# Patient Record
Sex: MALE | Race: BLACK OR AFRICAN AMERICAN | NOT HISPANIC OR LATINO | Employment: FULL TIME | URBAN - METROPOLITAN AREA
[De-identification: names, ages, dates, MRNs, and addresses within clinical notes are randomized per-mention and may not be internally consistent; named-entity substitution may affect disease eponyms.]

---

## 2017-03-29 ENCOUNTER — TELEPHONE (OUTPATIENT)
Dept: HEMATOLOGY/ONCOLOGY | Facility: CLINIC | Age: 52
End: 2017-03-29

## 2017-03-29 NOTE — TELEPHONE ENCOUNTER
----- Message from Dheeraj Schaeffer MD sent at 3/29/2017 10:18 AM CDT -----  Contact: patient  Stop xarelto 2 days before colonoscopy and start back on it after colonoscopy unless otherwise advised by his GI doctor. Check with GI doctor before restart. If any questions let me know. Thank you.  ----- Message -----     From: Shae Xie MA     Sent: 3/29/2017  10:06 AM       To: Dheeraj Schaeffer MD        ----- Message -----     From: Tammy Ortega     Sent: 3/29/2017   9:54 AM       To: Maksim Esqueda Staff    Patient called to speak with the nurse; he is scheduled for a colonoscopy. Is he is supposed to stop taking the Xarelto? Please call him at [phone].    Thanks,  Tammy

## 2017-05-11 ENCOUNTER — DOCUMENTATION ONLY (OUTPATIENT)
Dept: HEMATOLOGY/ONCOLOGY | Facility: CLINIC | Age: 52
End: 2017-05-11

## 2017-05-11 NOTE — PROGRESS NOTES
1410  5-11-17  The following pt no showed his appt and has been rescheduled for 5-11-17. Notified pt. jeannine

## 2017-06-05 ENCOUNTER — OFFICE VISIT (OUTPATIENT)
Dept: HEMATOLOGY/ONCOLOGY | Facility: CLINIC | Age: 52
End: 2017-06-05
Payer: COMMERCIAL

## 2017-06-05 VITALS
DIASTOLIC BLOOD PRESSURE: 70 MMHG | WEIGHT: 198.63 LBS | HEART RATE: 67 BPM | RESPIRATION RATE: 18 BRPM | OXYGEN SATURATION: 98 % | HEIGHT: 74 IN | TEMPERATURE: 98 F | BODY MASS INDEX: 25.49 KG/M2 | SYSTOLIC BLOOD PRESSURE: 120 MMHG

## 2017-06-05 DIAGNOSIS — Z86.711 HISTORY OF PULMONARY EMBOLISM: Primary | ICD-10-CM

## 2017-06-05 DIAGNOSIS — Z86.718 HISTORY OF DVT (DEEP VEIN THROMBOSIS): ICD-10-CM

## 2017-06-05 PROCEDURE — 99214 OFFICE O/P EST MOD 30 MIN: CPT | Mod: S$GLB,,, | Performed by: INTERNAL MEDICINE

## 2017-06-05 PROCEDURE — 99999 PR PBB SHADOW E&M-EST. PATIENT-LVL III: CPT | Mod: PBBFAC,,, | Performed by: INTERNAL MEDICINE

## 2017-06-05 NOTE — PROGRESS NOTES
Reason for visit: Followup for recurrent left lower extremity DVT/History of PE  Date of diagnosis: 03/21/2013    HPI:   The patient is a 52-year-old  male who presents to the hematology oncology clinic today for for follow up. He has a history of recurrent left lower extremity DVT and history of pulmonary embolism. Today he feels well and has no specific complaints.  He denies any chest pain or shortness of breath/dyspnea with exertion.  He reports that he has been taking his xarelto as prescribed and has not missed any doses. He is tolerating this medication well. He denies any fever, chills or night sweats.  He denies any melena, hematochezia, hematemesis, hemoptysis or hematuria.  He denies any bowel or urinary complaints.  I have reviewed all of the patient's interval medical history available in EPIC and have utilized this in my evaluation and management recommendations today.  He has been exercising regularly. He is uptodate with colon cancer and prostate cancer screening.    PAST MEDICAL HISTORY:   1. Left lower extremity DVT (Sept 5, 2011)  2. Bilateral PE (Sept 5, 2011)  3. Paroxysmal SVT    SURGICAL HISTORY:   1. Appendectomy    FAMILY HISTORY: He reports that one of his sisters was diagnosed with a DVT at the age of 28. She has since been diagnosed with protein C deficiency and is currently on therapeutic anticoagulation. He reports that a maternal aunt was also diagnosed with protein C deficiency. His mother has leukemia and was diagnosed at the age of 61. He denies any other immediate family members with cancer.    SOCIAL HISTORY: She does not smoke cigarettes. He drinks alcohol socially. He does not use any recreational drugs. He works as a  with [employer]. He is  and has 3 biological children and 2 stepchildren. He lives with his wife in [place].    ALLERGIES: [NKDA]    MEDICATIONS: [Medcard has been reviewed and/or reconciled.]    REVIEW  OF SYSTEMS:   GENERAL: [No fevers, chills or sweats. No weight loss or loss of appetite.] He denies fatigue.  HEENT: [No blurred vision, tinnitus, nasal discharge, sorethroat or dysphagia.]  HEART: He denies chest pain, palpitations and shortness of breath.  LUNGS: [No hemoptysis.] He denies cough. He denies chest pain, palpations and shortness of breath.  ABDOMEN: [No abdominal pain, nausea, vomiting, diarrhea, constipation or melena.]  GENITOURINARY: [No dysuria, bleeding or malodorous discharge.]  NEURO: [No headache, dizziness or vertigo.]  HEMATOLOGY: [No easy bruising, spontaneous bleeding in the past].  MUSCULOSKELETAL: [No myalgias, arthralgias or bone pains.]   SKIN: [No rashes or skin lesions.]  PSYCHIATRY: [No depression or anxiety.]    PHYSICAL EXAMINATION:   VS: Reviewed on nurse's notes.  APPEARANCE: The patient is a well-developed, well-nourished and well-groomed  male who appears in no acute distress.  HEENT: No scleral icterus. Both external auditory canals clear. No oral ulcers, lesions. Throat clear  HEAD: No sinus tenderness.  NECK: Supple. No palpable lymphadenopathy. Thyroid non-tender, no palpable masses  CHEST: Breath sounds clear bilaterally. No rales. No rhonchi. Unlabored respirations.  CARDIOVASCULAR: Normal S1, S2. Normal rate. Regular rhythm.  ABDOMEN: Bowel sounds normal. No tenderness. No abdominal distention. No hepatomegaly. No splenomegaly.  LYMPHATIC: No palpable supraclavicular, axillary nodes  EXTREMITIES: No erythema. No clubbing, cyanosis. Mild edema noted in left lower extremity.   SKIN: No lesions. No petechiae. No induration or nodules  NEUROLOGIC: No focal findings. Alert & Oriented x 3. Mood appropriate to affect    LABS:   Reviewed    IMAGING:  Reviewed    IMPRESSION:  1. History of bilateral saddle pulmonary emboli  2. History of extensive left lower extremity DVT  3. Post phlebitic syndrome  4. 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 I had a detailed discussion today about his current clinical situation.  At this time the patient will continue on therapeutic anticoagulation with xarelto.   2. The patient understands that he will have to be on therapeutic anticoagulation for the rest of his life.    Return to clinic in 1 year for followup.  He knows to call sooner for any new problems or questions.    Dheeraj Schaeffer MD

## 2017-11-01 DIAGNOSIS — I82.4Y2 ACUTE DEEP VEIN THROMBOSIS (DVT) OF PROXIMAL VEIN OF LEFT LOWER EXTREMITY: ICD-10-CM

## 2017-11-01 RX ORDER — RIVAROXABAN 20 MG/1
TABLET, FILM COATED ORAL
Qty: 30 TABLET | Refills: 5 | Status: SHIPPED | OUTPATIENT
Start: 2017-11-01 | End: 2018-04-07 | Stop reason: SDUPTHER

## 2017-11-28 ENCOUNTER — TELEPHONE (OUTPATIENT)
Dept: HEMATOLOGY/ONCOLOGY | Facility: CLINIC | Age: 52
End: 2017-11-28

## 2017-11-28 NOTE — TELEPHONE ENCOUNTER
----- Message from Dheeraj Schaeffer MD sent at 11/28/2017  1:58 PM CST -----  Yes he can take antiinflammatory medicine as needed. thanks  ----- Message -----  From: Shae Xie MA  Sent: 11/28/2017   1:42 PM  To: Dheeraj Schaeffer MD     Is this ok to take?  ----- Message -----  From: Betty Kraus  Sent: 11/28/2017   1:38 PM  To: Maksim Esqueda Staff    Please call pt back about shoulder pain. Pt is not sure if he can take an antiflamatorry because he is on zerelto?????? Please call pt at [phone].

## 2017-12-13 ENCOUNTER — OFFICE VISIT (OUTPATIENT)
Dept: HEMATOLOGY/ONCOLOGY | Facility: CLINIC | Age: 52
End: 2017-12-13
Payer: COMMERCIAL

## 2017-12-13 ENCOUNTER — HOSPITAL ENCOUNTER (OUTPATIENT)
Dept: RADIOLOGY | Facility: HOSPITAL | Age: 52
Discharge: HOME | End: 2017-12-13
Attending: INTERNAL MEDICINE
Payer: COMMERCIAL

## 2017-12-13 VITALS
HEART RATE: 87 BPM | HEIGHT: 74 IN | BODY MASS INDEX: 26.43 KG/M2 | OXYGEN SATURATION: 100 % | SYSTOLIC BLOOD PRESSURE: 133 MMHG | RESPIRATION RATE: 18 BRPM | TEMPERATURE: 98 F | WEIGHT: 205.94 LBS | DIASTOLIC BLOOD PRESSURE: 88 MMHG

## 2017-12-13 DIAGNOSIS — D68.59 PRIMARY HYPERCOAGULABLE STATE: ICD-10-CM

## 2017-12-13 DIAGNOSIS — M79.89 LEG SWELLING: ICD-10-CM

## 2017-12-13 DIAGNOSIS — Z86.718 HISTORY OF DVT (DEEP VEIN THROMBOSIS): Primary | ICD-10-CM

## 2017-12-13 DIAGNOSIS — Z86.711 HISTORY OF PULMONARY EMBOLISM: ICD-10-CM

## 2017-12-13 DIAGNOSIS — Z86.718 HISTORY OF DVT (DEEP VEIN THROMBOSIS): ICD-10-CM

## 2017-12-13 DIAGNOSIS — I87.009 POST-PHLEBITIC SYNDROME: ICD-10-CM

## 2017-12-13 PROCEDURE — 93971 EXTREMITY STUDY: CPT | Mod: 26,,, | Performed by: RADIOLOGY

## 2017-12-13 PROCEDURE — 99999 PR PBB SHADOW E&M-EST. PATIENT-LVL III: CPT | Mod: PBBFAC,,, | Performed by: INTERNAL MEDICINE

## 2017-12-13 PROCEDURE — 93971 EXTREMITY STUDY: CPT | Mod: TC,PO

## 2017-12-13 PROCEDURE — 99214 OFFICE O/P EST MOD 30 MIN: CPT | Mod: S$GLB,,, | Performed by: INTERNAL MEDICINE

## 2017-12-13 NOTE — PROGRESS NOTES
Reason for visit: Followup for recurrent left lower extremity DVT/History of PE/urgent care visit     HPI:   The patient is a 52-year-old  male who presents to the hematology oncology clinic today for an urgent care visit. He has a history of recurrent left lower extremity DVT and history of pulmonary embolism. Today he reports that since yesterday after known he has been having increased pain and swelling in his left thigh.  He has been off his Xarelto for the past 2 days for a scheduled left shoulder manipulation procedure tomorrow with orthopedics.   Otherwise feels well and has no specific complaints.  He denies any chest pain or shortness of breath/dyspnea with exertion.  He denies any fever, chills or night sweats.  He denies any melena, hematochezia, hematemesis, hemoptysis or hematuria.  He denies any bowel or urinary complaints.  I have reviewed all of the patient's interval medical history available in EPIC and have utilized this in my evaluation and management recommendations today.  He has been exercising regularly. He is uptodate with colon cancer and prostate cancer screening.    PAST MEDICAL HISTORY:   1. Left lower extremity DVT (Sept 5, 2011)  2. Bilateral PE (Sept 5, 2011)  3. Paroxysmal SVT    SURGICAL HISTORY:   1. Appendectomy    FAMILY HISTORY: He reports that one of his sisters was diagnosed with a DVT at the age of 28. She has since been diagnosed with protein C deficiency and is currently on therapeutic anticoagulation. He reports that a maternal aunt was also diagnosed with protein C deficiency. His mother has leukemia and was diagnosed at the age of 61. He denies any other immediate family members with cancer.    SOCIAL HISTORY: She does not smoke cigarettes. He drinks alcohol socially. He does not use any recreational drugs. He works as a  with [employer]. He is  and has 3 biological children and 2 stepchildren. He lives with his wife  in [place].    ALLERGIES: [NKDA]    MEDICATIONS: [Medcard has been reviewed and/or reconciled.]    REVIEW OF SYSTEMS:   GENERAL: [No fevers, chills or sweats. No weight loss or loss of appetite.] He denies fatigue.  HEENT: [No blurred vision, tinnitus, nasal discharge, sorethroat or dysphagia.]  HEART: He denies chest pain, palpitations and shortness of breath.  LUNGS: [No hemoptysis.] He denies cough. He denies chest pain, palpations and shortness of breath.  ABDOMEN: [No abdominal pain, nausea, vomiting, diarrhea, constipation or melena.]  GENITOURINARY: [No dysuria, bleeding or malodorous discharge.]  NEURO: [No headache, dizziness or vertigo.]  HEMATOLOGY: [No easy bruising, spontaneous bleeding in the past].  MUSCULOSKELETAL: [No myalgias, arthralgias or bone pains.]   SKIN: [No rashes or skin lesions.]  PSYCHIATRY: [No depression or anxiety.]    PHYSICAL EXAMINATION:   VS: Reviewed on nurse's notes.  APPEARANCE: The patient is a well-developed, well-nourished and well-groomed  male who appears in no acute distress.  HEENT: No scleral icterus. Both external auditory canals clear. No oral ulcers, lesions. Throat clear  HEAD: No sinus tenderness.  NECK: Supple. No palpable lymphadenopathy. Thyroid non-tender, no palpable masses  CHEST: Breath sounds clear bilaterally. No rales. No rhonchi. Unlabored respirations.  CARDIOVASCULAR: Normal S1, S2. Normal rate. Regular rhythm.  ABDOMEN: Bowel sounds normal. No tenderness. No abdominal distention. No hepatomegaly. No splenomegaly.  LYMPHATIC: No palpable supraclavicular, axillary nodes  EXTREMITIES: No erythema. No clubbing, cyanosis. Mild edema noted in left lower extremity in the area of the thigh.   SKIN: No lesions. No petechiae. No induration or nodules  NEUROLOGIC: No focal findings. Alert & Oriented x 3. Mood appropriate to affect    LABS:   Reviewed    IMAGING:  Reviewed    IMPRESSION:  1. History of bilateral saddle pulmonary emboli  2.  History of extensive left lower extremity DVT  3. Post phlebitic syndrome  4. Heterozygosity for prothrombin [id number] mutation  5.  Left lower extremity edema    PLAN:  1. I had a detailed discussion today about his current clinical situation.  I did obtain an ultrasound of the left lower extremity today.  This shows evidence of his known chronic clot but there appears to be no evidence of new acute DVT in the left lower extremity.  The findings were discussed in detail with the patient and his wife today.  He was advised to take Tylenol PRN pain and to keep the limb elevated.  2.  Considering the above he will proceed with his orthopedic procedure tomorrow as scheduled.  He has been advised to resume his therapeutic anticoagulation with Xarelto immediately after completion of his procedure.  3. The patient understands that he will have to be on therapeutic anticoagulation for the rest of his life.  4.  He knows to seek immediate attention/proceed to the emergency room/contact my office if nonresolution and/or worsening of his symptoms or if any new symptoms such as chest pain or shortness of breath.    Return to clinic in 6 months for followup.  He knows to call sooner for any new problems or questions.    Dheeraj Schaeffer MD

## 2017-12-14 ENCOUNTER — TELEPHONE (OUTPATIENT)
Dept: HEMATOLOGY/ONCOLOGY | Facility: CLINIC | Age: 52
End: 2017-12-14

## 2017-12-14 NOTE — TELEPHONE ENCOUNTER
----- Message from Dheeraj Schaeffer MD sent at 12/13/2017  5:32 PM CST -----  Please schedule follow-up for the patient to come and see me back in 6 months in the clinic at [facility].  Thank you.

## 2017-12-15 ENCOUNTER — TELEPHONE (OUTPATIENT)
Dept: HEMATOLOGY/ONCOLOGY | Facility: CLINIC | Age: 52
End: 2017-12-15

## 2017-12-15 NOTE — TELEPHONE ENCOUNTER
----- Message from Alhtea Young sent at 12/15/2017  3:28 PM CST -----  Contact: [phone]  Pt would like to consult with the nurse about [place].  Please call back at [phone]. Thx-AH

## 2018-04-07 DIAGNOSIS — I82.4Y2 ACUTE DEEP VEIN THROMBOSIS (DVT) OF PROXIMAL VEIN OF LEFT LOWER EXTREMITY: ICD-10-CM

## 2018-04-09 RX ORDER — RIVAROXABAN 20 MG/1
TABLET, FILM COATED ORAL
Qty: 90 TABLET | Refills: 1 | Status: SHIPPED | OUTPATIENT
Start: 2018-04-09 | End: 2018-10-31 | Stop reason: SDUPTHER

## 2018-08-12 ENCOUNTER — HOSPITAL ENCOUNTER (EMERGENCY)
Facility: HOSPITAL | Age: 53
Discharge: HOME | End: 2018-08-12
Attending: EMERGENCY MEDICINE
Payer: COMMERCIAL

## 2018-08-12 VITALS
DIASTOLIC BLOOD PRESSURE: 72 MMHG | OXYGEN SATURATION: 96 % | BODY MASS INDEX: 26.82 KG/M2 | TEMPERATURE: 98 F | SYSTOLIC BLOOD PRESSURE: 148 MMHG | RESPIRATION RATE: 18 BRPM | HEART RATE: 77 BPM | WEIGHT: 209 LBS | HEIGHT: 74 IN
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
## 2018-08-12 DIAGNOSIS — S16.1XXA STRAIN OF NECK MUSCLE, INITIAL ENCOUNTER: ICD-10-CM

## 2018-08-12 DIAGNOSIS — S09.90XA INJURY OF HEAD, INITIAL ENCOUNTER: ICD-10-CM

## 2018-08-12 PROCEDURE — 99284 EMERGENCY DEPT VISIT MOD MDM: CPT | Mod: 25

## 2018-08-12 PROCEDURE — 25000003 PHARM REV CODE 250: Performed by: REGISTERED NURSE

## 2018-08-12 RX ORDER — CYCLOBENZAPRINE HCL 10 MG
10 TABLET ORAL
Status: COMPLETED | OUTPATIENT
Start: 2018-08-12 | End: 2018-08-12

## 2018-08-12 RX ORDER — HYDROCODONE BITARTRATE AND ACETAMINOPHEN 5; 325 MG/1; MG/1
1 TABLET ORAL EVERY 6 HOURS PRN
Qty: 12 TABLET | Refills: 0 | Status: SHIPPED | OUTPATIENT
Start: 2018-08-12 | End: 2019-05-06 | Stop reason: ALTCHOICE

## 2018-08-12 RX ORDER — HYDROCODONE BITARTRATE AND ACETAMINOPHEN 5; 325 MG/1; MG/1
1 TABLET ORAL
Status: COMPLETED | OUTPATIENT
Start: 2018-08-12 | End: 2018-08-12

## 2018-08-12 RX ORDER — CYCLOBENZAPRINE HCL 10 MG
10 TABLET ORAL 3 TIMES DAILY PRN
Qty: 15 TABLET | Refills: 0 | Status: SHIPPED | OUTPATIENT
Start: 2018-08-12 | End: 2018-08-17

## 2018-08-12 RX ADMIN — HYDROCODONE BITARTRATE AND ACETAMINOPHEN 1 TABLET: 5; 325 TABLET ORAL at 09:08

## 2018-08-12 RX ADMIN — CYCLOBENZAPRINE HYDROCHLORIDE 10 MG: 10 TABLET, FILM COATED ORAL at 09:08
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
## 2018-10-31 DIAGNOSIS — I82.4Y2 ACUTE DEEP VEIN THROMBOSIS (DVT) OF PROXIMAL VEIN OF LEFT LOWER EXTREMITY: ICD-10-CM

## 2018-10-31 RX ORDER — RIVAROXABAN 20 MG/1
TABLET, FILM COATED ORAL
Qty: 90 TABLET | Refills: 3 | Status: SHIPPED | OUTPATIENT
Start: 2018-10-31 | End: 2019-05-06 | Stop reason: SDUPTHER

## 2019-05-06 ENCOUNTER — HOSPITAL ENCOUNTER (EMERGENCY)
Facility: HOSPITAL | Age: 54
Discharge: HOME | End: 2019-05-06
Attending: EMERGENCY MEDICINE
Payer: COMMERCIAL

## 2019-05-06 VITALS
HEIGHT: 74 IN | WEIGHT: 205.5 LBS | BODY MASS INDEX: 26.37 KG/M2 | RESPIRATION RATE: 16 BRPM | SYSTOLIC BLOOD PRESSURE: 98 MMHG | HEART RATE: 66 BPM | DIASTOLIC BLOOD PRESSURE: 66 MMHG | TEMPERATURE: 98 F | OXYGEN SATURATION: 99 %

## 2019-05-06 DIAGNOSIS — R07.9 CHEST PAIN: ICD-10-CM

## 2019-05-06 DIAGNOSIS — R05.9 COUGH: Primary | ICD-10-CM

## 2019-05-06 DIAGNOSIS — R06.02 SHORTNESS OF BREATH: ICD-10-CM

## 2019-05-06 LAB
ALBUMIN SERPL BCP-MCNC: 4 G/DL (ref 3.5–5.2)
ALP SERPL-CCNC: 70 U/L (ref 55–135)
ALT SERPL W/O P-5'-P-CCNC: 19 U/L (ref 10–44)
ANION GAP SERPL CALC-SCNC: 9 MMOL/L (ref 8–16)
AST SERPL-CCNC: 17 U/L (ref 10–40)
BASOPHILS # BLD AUTO: 0.02 K/UL (ref 0–0.2)
BASOPHILS NFR BLD: 0.3 % (ref 0–1.9)
BILIRUB SERPL-MCNC: 0.5 MG/DL (ref 0.1–1)
BNP SERPL-MCNC: 18 PG/ML (ref 0–99)
BUN SERPL-MCNC: 14 MG/DL (ref 6–20)
CALCIUM SERPL-MCNC: 9.7 MG/DL (ref 8.7–10.5)
CHLORIDE SERPL-SCNC: 104 MMOL/L (ref 95–110)
CO2 SERPL-SCNC: 26 MMOL/L (ref 23–29)
CREAT SERPL-MCNC: 1.1 MG/DL (ref 0.5–1.4)
D DIMER PPP IA.FEU-MCNC: <0.19 MG/L FEU
DIFFERENTIAL METHOD: ABNORMAL
EOSINOPHIL # BLD AUTO: 0.4 K/UL (ref 0–0.5)
EOSINOPHIL NFR BLD: 6.2 % (ref 0–8)
ERYTHROCYTE [DISTWIDTH] IN BLOOD BY AUTOMATED COUNT: 14.7 % (ref 11.5–14.5)
EST. GFR  (AFRICAN AMERICAN): >60 ML/MIN/1.73 M^2
EST. GFR  (NON AFRICAN AMERICAN): >60 ML/MIN/1.73 M^2
GLUCOSE SERPL-MCNC: 86 MG/DL (ref 70–110)
HCT VFR BLD AUTO: 43.7 % (ref 40–54)
HGB BLD-MCNC: 14.5 G/DL (ref 14–18)
LYMPHOCYTES # BLD AUTO: 3.3 K/UL (ref 1–4.8)
LYMPHOCYTES NFR BLD: 52.1 % (ref 18–48)
MCH RBC QN AUTO: 27.5 PG (ref 27–31)
MCHC RBC AUTO-ENTMCNC: 33.2 G/DL (ref 32–36)
MCV RBC AUTO: 83 FL (ref 82–98)
MONOCYTES # BLD AUTO: 0.4 K/UL (ref 0.3–1)
MONOCYTES NFR BLD: 5.7 % (ref 4–15)
NEUTROPHILS # BLD AUTO: 2.2 K/UL (ref 1.8–7.7)
NEUTROPHILS NFR BLD: 35.7 % (ref 38–73)
PLATELET # BLD AUTO: 226 K/UL (ref 150–350)
PMV BLD AUTO: 10.6 FL (ref 9.2–12.9)
POTASSIUM SERPL-SCNC: 4 MMOL/L (ref 3.5–5.1)
PROT SERPL-MCNC: 7.5 G/DL (ref 6–8.4)
RBC # BLD AUTO: 5.27 M/UL (ref 4.6–6.2)
SODIUM SERPL-SCNC: 139 MMOL/L (ref 136–145)
TROPONIN I SERPL DL<=0.01 NG/ML-MCNC: <0.006 NG/ML (ref 0–0.03)
WBC # BLD AUTO: 6.28 K/UL (ref 3.9–12.7)

## 2019-05-06 PROCEDURE — 93010 EKG 12-LEAD: ICD-10-PCS | Mod: ,,, | Performed by: INTERNAL MEDICINE

## 2019-05-06 PROCEDURE — 36415 COLL VENOUS BLD VENIPUNCTURE: CPT

## 2019-05-06 PROCEDURE — 85379 FIBRIN DEGRADATION QUANT: CPT

## 2019-05-06 PROCEDURE — 93010 ELECTROCARDIOGRAM REPORT: CPT | Mod: ,,, | Performed by: INTERNAL MEDICINE

## 2019-05-06 PROCEDURE — 93005 ELECTROCARDIOGRAM TRACING: CPT

## 2019-05-06 PROCEDURE — 99285 EMERGENCY DEPT VISIT HI MDM: CPT | Mod: 25

## 2019-05-06 PROCEDURE — 83880 ASSAY OF NATRIURETIC PEPTIDE: CPT

## 2019-05-06 PROCEDURE — 80053 COMPREHEN METABOLIC PANEL: CPT

## 2019-05-06 PROCEDURE — 85025 COMPLETE CBC W/AUTO DIFF WBC: CPT

## 2019-05-06 PROCEDURE — 84484 ASSAY OF TROPONIN QUANT: CPT

## 2019-05-06 RX ORDER — AZITHROMYCIN 250 MG/1
250 TABLET, FILM COATED ORAL DAILY
Qty: 6 TABLET | Refills: 0 | Status: SHIPPED | OUTPATIENT
Start: 2019-05-06 | End: 2019-06-03

## 2019-05-06 RX ORDER — NAPROXEN 375 MG/1
375 TABLET ORAL 2 TIMES DAILY WITH MEALS
Qty: 60 TABLET | Refills: 0 | Status: SHIPPED | OUTPATIENT
Start: 2019-05-06 | End: 2019-06-03

## 2019-05-06 NOTE — ED PROVIDER NOTES
"SCRIBE #1 NOTE: I, Corinne Mack, am scribing for, and in the presence of, Ren Michel Do, MD. I have scribed the entire note.      History      Chief Complaint   Patient presents with    Shortness of Breath     Pt states, "I have had a cough for about a month and now every time I breathe I get a dull pain and I have a history of PE in both lungs."       Review of patient's allergies indicates:  No Known Allergies     HPI   HPI    5/6/2019, 5:25 PM   History obtained from the patient      History of Present Illness: Jean-Paul Reeves is a 53 y.o. male patient with PMHx of PE who presents to the Emergency Department for SOB which onset gradually a few days ago. Pt has had a cough for about a month and saw his PCP on 04/29/19. Pt was dx with bronchitis and started on steroids and abx. Pt's cough has persisted despite tx and now he is having L upper CP. Pt reports that he has also been on several flight and long road trips recently. Symptoms are constant and moderate in severity. No mitigating or exacerbating factors reported. Associated sxs include congestion. Patient denies any fever, chills, rhinorrhea, sore throat, N/V/D, abd pain, neck pain, back pain, HA, dizziness, and all other sxs at this time. No prior Tx reported. Pt is on xarelto. No further complaints or concerns at this time.         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   HENT: Positive for congestion. Negative for rhinorrhea and sore throat.    Respiratory: Positive for cough and shortness of breath.    Cardiovascular: Positive for chest pain (L upper). Negative for leg swelling.   Gastrointestinal: Negative for abdominal pain, diarrhea, nausea and vomiting.   Musculoskeletal: Negative for back pain, neck pain and neck stiffness.   Skin: Negative for rash and wound.   Neurological: Negative for dizziness, light-headedness, numbness and headaches.   All other systems reviewed and are negative.    Physical Exam      Initial Vitals [05/06/19 1702]   BP Pulse Resp Temp SpO2   (!) 182/86 70 20 98.2 °F (36.8 °C) 98 %      MAP       --          Physical Exam  Nursing Notes and Vital Signs Reviewed.  Constitutional: Patient is in no acute distress. Well-developed and well-nourished.  Head: Atraumatic. Normocephalic.  Eyes: PERRL. EOM intact. Conjunctivae are not pale. No scleral icterus.  ENT: Mucous membranes are moist. Oropharynx is clear and symmetric.    Neck: Supple. Full ROM. No lymphadenopathy.  Cardiovascular: Regular rate. Regular rhythm. No murmurs, rubs, or gallops. Distal pulses are 2+ and symmetric.  Pulmonary/Chest: No respiratory distress. Clear to auscultation bilaterally. No wheezing or rales.  Abdominal: Soft and non-distended.  There is no tenderness.  No rebound, guarding, or rigidity.   Musculoskeletal: Moves all extremities. No obvious deformities. No edema.   Skin: Warm and dry.  Neurological:  Alert, awake, and appropriate.  Normal speech.  No acute focal neurological deficits are appreciated.  Psychiatric: Normal affect. Good eye contact. Appropriate in content.    ED Course    Procedures  ED Vital Signs:  Vitals:    05/06/19 1702 05/06/19 1723 05/06/19 1808   BP: (!) 182/86 126/79 129/71   Pulse: 70 69 66   Resp: 20 16 18   Temp: 98.2  "°F (36.8 °C)     TempSrc: Oral     SpO2: 98% 99% 97%   Weight: 93.2 kg (205 lb 7.5 oz)     Height: 6' 2" (1.88 m)         Abnormal Lab Results:  Labs Reviewed   CBC W/ AUTO DIFFERENTIAL - Abnormal; Notable for the following components:       Result Value    RDW 14.7 (*)     Gran% 35.7 (*)     Lymph% 52.1 (*)     All other components within normal limits   COMPREHENSIVE METABOLIC PANEL   TROPONIN I   B-TYPE NATRIURETIC PEPTIDE   D DIMER, QUANTITATIVE        All Lab Results:  Results for orders placed or performed during the hospital encounter of 05/06/19   CBC auto differential   Result Value Ref Range    WBC 6.28 3.90 - 12.70 K/uL    RBC 5.27 4.60 - 6.20 M/uL    Hemoglobin 14.5 14.0 - 18.0 g/dL    Hematocrit 43.7 40.0 - 54.0 %    Mean Corpuscular Volume 83 82 - 98 fL    Mean Corpuscular Hemoglobin 27.5 27.0 - 31.0 pg    Mean Corpuscular Hemoglobin Conc 33.2 32.0 - 36.0 g/dL    RDW 14.7 (H) 11.5 - 14.5 %    Platelets 226 150 - 350 K/uL    MPV 10.6 9.2 - 12.9 fL    Gran # (ANC) 2.2 1.8 - 7.7 K/uL    Lymph # 3.3 1.0 - 4.8 K/uL    Mono # 0.4 0.3 - 1.0 K/uL    Eos # 0.4 0.0 - 0.5 K/uL    Baso # 0.02 0.00 - 0.20 K/uL    Gran% 35.7 (L) 38.0 - 73.0 %    Lymph% 52.1 (H) 18.0 - 48.0 %    Mono% 5.7 4.0 - 15.0 %    Eosinophil% 6.2 0.0 - 8.0 %    Basophil% 0.3 0.0 - 1.9 %    Differential Method Automated    Comprehensive metabolic panel   Result Value Ref Range    Sodium 139 136 - 145 mmol/L    Potassium 4.0 3.5 - 5.1 mmol/L    Chloride 104 95 - 110 mmol/L    CO2 26 23 - 29 mmol/L    Glucose 86 70 - 110 mg/dL    BUN, Bld 14 6 - 20 mg/dL    Creatinine 1.1 0.5 - 1.4 mg/dL    Calcium 9.7 8.7 - 10.5 mg/dL    Total Protein 7.5 6.0 - 8.4 g/dL    Albumin 4.0 3.5 - 5.2 g/dL    Total Bilirubin 0.5 0.1 - 1.0 mg/dL    Alkaline Phosphatase 70 55 - 135 U/L    AST 17 10 - 40 U/L    ALT 19 10 - 44 U/L    Anion Gap 9 8 - 16 mmol/L    eGFR if African American >60 >60 mL/min/1.73 m^2    eGFR if non African American >60 >60 mL/min/1.73 m^2 "   Troponin I #1   Result Value Ref Range    Troponin I <0.006 0.000 - 0.026 ng/mL   B-Type natriuretic peptide (BNP)   Result Value Ref Range    BNP 18 0 - 99 pg/mL   D dimer, quantitative   Result Value Ref Range    D-Dimer <0.19 <0.50 mg/L FEU       Imaging Results:  Imaging Results          X-Ray Chest PA And Lateral (Final result)  Result time 05/06/19 17:43:36    Final result by Tremaine Stone MD (05/06/19 17:43:36)                 Impression:      No acute findings.      Electronically signed by: Tremaine Stone MD  Date:    05/06/2019  Time:    17:43             Narrative:    EXAMINATION:  XR CHEST PA AND LATERAL    CLINICAL HISTORY:  Chest Pain;    TECHNIQUE:  PA and lateral views of the chest were performed.    COMPARISON:  11/16/2014    FINDINGS:  The cardiac and mediastinal silhouettes appear within normal limits.   The lungs are clear bilaterally.  No acute osseous findings demonstrated.                                 The EKG was ordered, reviewed, and independently interpreted by the ED provider.  Interpretation time: 1705  Rate: 63 BPM  Rhythm: normal sinus rhythm  Interpretation: Possible L atrial enlargement. Incomplete RBBB. Septal infarct. No STEMI.           The Emergency Provider reviewed the vital signs and test results, which are outlined above.    ED Discussion     6:31 PM: Reassessed pt at this time. Pt is awake, alert, and in no distress. Discussed with pt all pertinent ED information and results. Discussed pt dx and plan of tx. Pt will be started on a Z-darlene since his cough has lasted over a month. Gave pt all f/u and return to the ED instructions. All questions and concerns were addressed at this time. Pt expresses understanding of information and instructions, and is comfortable with plan to discharge. Pt is stable for discharge.    I discussed with patient and/or family/caretaker that evaluation in the ED does not suggest any emergent or life threatening medical conditions requiring immediate  intervention beyond what was provided in the ED, and I believe patient is safe for discharge.  Regardless, an unremarkable evaluation in the ED does not preclude the development or presence of a serious of life threatening condition. As such, patient was instructed to return immediately for any worsening or change in current symptoms.    ED Medication(s):  Medications - No data to display       Medication List      START taking these medications    azithromycin 250 MG tablet  Commonly known as:  Z-CHIDI  Take 1 tablet (250 mg total) by mouth once daily. Take first 2 tablets together, then 1 every day until finished.     naproxen 375 MG tablet  Commonly known as:  NAPROSYN  Take 1 tablet (375 mg total) by mouth 2 (two) times daily with meals.        ASK your doctor about these medications    AMBIEN 10 mg Tab  Generic drug:  zolpidem     XARELTO 20 mg Tab  Generic drug:  rivaroxaban  TAKE 1 TABLET BY MOUTH EVERY DAY           Where to Get Your Medications      These medications were sent to [facility] [address] [place] [id number]    Phone:  [phone]   · azithromycin 250 MG tablet  · naproxen 375 MG tablet         Follow-up Information     Yosvany Winn MD In 2 days.    Specialty:  Family Medicine  Contact information:  [address] [facility] [address] [phone]                     Medical Decision Making    Medical Decision Making:   Clinical Tests:   Lab Tests: Ordered and Reviewed  Radiological Study: Ordered and Reviewed  Medical Tests: Ordered and Reviewed           Scribe Attestation:   Scribe #1: I performed the above scribed service and the documentation accurately describes the services I performed. I attest to the accuracy of the note 05/06/2019.    Attending:   Physician Attestation Statement for Scribe #1: I, Ren Michel Do, MD, personally performed the services described in this documentation, as  scribed by Corinne Mack, in my presence, and it is both accurate and complete.          Clinical Impression       ICD-10-CM ICD-9-CM   1. Cough R05 786.2   2. Shortness of breath R06.02 786.05   3. Chest pain R07.9 786.50       Disposition:   Disposition: Discharged  Condition: Stable           Ren Michel Do, MD  05/06/19 [id number]

## 2019-05-28 DIAGNOSIS — R05.9 COUGH: Primary | ICD-10-CM

## 2019-06-03 ENCOUNTER — OFFICE VISIT (OUTPATIENT)
Dept: PULMONOLOGY | Facility: CLINIC | Age: 54
End: 2019-06-03
Payer: COMMERCIAL

## 2019-06-03 ENCOUNTER — HOSPITAL ENCOUNTER (OUTPATIENT)
Dept: RADIOLOGY | Facility: HOSPITAL | Age: 54
Discharge: HOME | End: 2019-06-03
Attending: NURSE PRACTITIONER
Payer: COMMERCIAL

## 2019-06-03 VITALS
HEIGHT: 74 IN | OXYGEN SATURATION: 99 % | BODY MASS INDEX: 26.28 KG/M2 | HEART RATE: 68 BPM | SYSTOLIC BLOOD PRESSURE: 121 MMHG | DIASTOLIC BLOOD PRESSURE: 70 MMHG | RESPIRATION RATE: 18 BRPM | WEIGHT: 204.81 LBS

## 2019-06-03 DIAGNOSIS — R05.9 COUGH: ICD-10-CM

## 2019-06-03 DIAGNOSIS — J45.30 MILD PERSISTENT ASTHMA WITHOUT COMPLICATION: Primary | ICD-10-CM

## 2019-06-03 DIAGNOSIS — J30.89 NON-SEASONAL ALLERGIC RHINITIS DUE TO OTHER ALLERGIC TRIGGER: ICD-10-CM

## 2019-06-03 DIAGNOSIS — J45.991 COUGH VARIANT ASTHMA: ICD-10-CM

## 2019-06-03 DIAGNOSIS — R09.82 POST-NASAL DRIP: ICD-10-CM

## 2019-06-03 PROCEDURE — 71046 XR CHEST PA AND LATERAL: ICD-10-PCS | Mod: 26,,, | Performed by: RADIOLOGY

## 2019-06-03 PROCEDURE — 99999 PR PBB SHADOW E&M-EST. PATIENT-LVL III: ICD-10-PCS | Mod: PBBFAC,,, | Performed by: NURSE PRACTITIONER

## 2019-06-03 PROCEDURE — 71046 X-RAY EXAM CHEST 2 VIEWS: CPT | Mod: 26,,, | Performed by: RADIOLOGY

## 2019-06-03 PROCEDURE — 3008F PR BODY MASS INDEX (BMI) DOCUMENTED: ICD-10-PCS | Mod: CPTII,S$GLB,, | Performed by: NURSE PRACTITIONER

## 2019-06-03 PROCEDURE — 99204 OFFICE O/P NEW MOD 45 MIN: CPT | Mod: S$GLB,,, | Performed by: NURSE PRACTITIONER

## 2019-06-03 PROCEDURE — 99999 PR PBB SHADOW E&M-EST. PATIENT-LVL III: CPT | Mod: PBBFAC,,, | Performed by: NURSE PRACTITIONER

## 2019-06-03 PROCEDURE — 3008F BODY MASS INDEX DOCD: CPT | Mod: CPTII,S$GLB,, | Performed by: NURSE PRACTITIONER

## 2019-06-03 PROCEDURE — 71046 X-RAY EXAM CHEST 2 VIEWS: CPT | Mod: TC

## 2019-06-03 PROCEDURE — 99204 PR OFFICE/OUTPT VISIT, NEW, LEVL IV, 45-59 MIN: ICD-10-PCS | Mod: S$GLB,,, | Performed by: NURSE PRACTITIONER

## 2019-06-03 RX ORDER — FLUTICASONE PROPIONATE 50 MCG
2 SPRAY, SUSPENSION (ML) NASAL DAILY
Qty: 16 G | Refills: 11 | Status: SHIPPED | OUTPATIENT
Start: 2019-06-03 | End: 2020-06-02

## 2019-06-03 RX ORDER — MONTELUKAST SODIUM 10 MG/1
10 TABLET ORAL NIGHTLY
Qty: 90 TABLET | Refills: 3 | Status: SHIPPED | OUTPATIENT
Start: 2019-06-03 | End: 2020-05-29

## 2019-06-03 NOTE — PROGRESS NOTES
Subjective:      Patient ID: Jean-Paul Reeves is a 54 y.o. male.    Patient Active Problem List   Diagnosis    History of pulmonary embolism    Primary hypercoagulable state    Internal derangement of knee joint    Chondromalacia of left knee    PSVT (paroxysmal supraventricular tachycardia)    History of DVT (deep vein thrombosis)    Post-phlebitic syndrome    Mild persistent asthma without complication     he has been referred by [facility] for evaluation and management for   Chief Complaint   Patient presents with    Cough    Shortness of Breath     Chief Complaint: Cough and Shortness of Breath    HPI:  Jean-Paul Reeves is a 54 y.o. male presents to pulmonary clinic initial evaluation related to chronic cough since before 2011.   History of Asthma diagnosed as a child. Never treatment for asthma as a teen or adult.  He had recent ER visit 5/6/2019 diagnosis with Bronchitis, provided z pack there has been some gradual improvement in cough since recent flare back to baseline of intermittent cough.    He had seen his primary care provider, Dr. Winn in early April 2019 with steroid injection and antibiotic therapy, no improvement in cough. Patient presented to ER with history of dvt and bilateral PE in 2011 for evaluation of cough and chest pain with real deep breath.    On Xarelto therapy since about 2015.   Not inhaler therapy. No wheezing reported.   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 Occupational exposure to no exposure to fumes or dust or chemical . Has full face respirator to use if handling powders, about once a month use of respirator.     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       Pet Exposures:  Dogs, miniature poddle. Denies allergy to Dog and  cat dander.    Previous Report Reviewed: lab  reports, office notes and radiology reports.      Past Medical History: The following portions of the patient's history were reviewed and updated as appropriate:   He  has a past surgical history that includes Appendectomy.  His family history includes Cancer in his mother.  He  reports that he has never smoked. He has never used smokeless tobacco. He reports that he drinks alcohol. He reports that he does not use drugs.  He has a current medication list which includes the following prescription(s): xarelto, zolpidem, albuterol sulfate, fluticasone propionate, and montelukast.  He has No Known Allergies.    Review of Systems   Constitutional: Negative for fever, chills, weight loss, weight gain, activity change, appetite change, fatigue and night sweats.   HENT: Positive for postnasal drip. Negative for nosebleeds, rhinorrhea, sinus pressure, sore throat, trouble swallowing, voice change, congestion and ear pain.    Eyes: Negative for redness and itching.   Respiratory: Positive for cough (chronic) and chest tightness (in past with Bronchitis flare early April 2019). Negative for apnea, snoring, sputum production, choking, shortness of breath, wheezing, orthopnea, asthma nighttime symptoms, dyspnea on extertion, use of rescue inhaler and somnolence.    Cardiovascular: Negative.  Negative for chest pain, palpitations and leg swelling.   Genitourinary: Negative for difficulty urinating and hematuria.   Endocrine: Negative for cold intolerance and heat intolerance.    Musculoskeletal: Negative for arthralgias, gait problem, joint swelling and myalgias.   Skin: Negative.    Gastrointestinal: Negative for nausea, vomiting, abdominal pain and acid reflux.   Neurological: Negative for dizziness, weakness, light-headedness and headaches.   Hematological: Negative for adenopathy. No excessive bruising.   Psychiatric/Behavioral: Negative.  Negative for sleep disturbance. The patient is not nervous/anxious.    All other  "systems reviewed and are negative.       Objective:   /70   Pulse 68   Resp 18   Ht 6' 2" (1.88 m)   Wt 92.9 kg (204 lb 12.9 oz)   SpO2 99%   BMI 26.30 kg/m²   Physical Exam   Constitutional: He is oriented to person, place, and time. Vital signs are normal. He appears well-developed and well-nourished. He is active and cooperative.  Non-toxic appearance. He does not appear ill. No distress.   HENT:   Head: Normocephalic and atraumatic.   Right Ear: External ear normal.   Left Ear: External ear normal.   Nose: Nose normal.   Mouth/Throat: Uvula is midline and mucous membranes are normal. Posterior oropharyngeal edema (mild) and posterior oropharyngeal erythema (mild) present. No oropharyngeal exudate. No tonsillar exudate.       Eyes: Conjunctivae are normal.   Neck: Normal range of motion. Neck supple.   Cardiovascular: Normal rate, regular rhythm, normal heart sounds and intact distal pulses.   Pulmonary/Chest: Effort normal and breath sounds normal. No stridor. He has no wheezes. He has no rales.   Abdominal: Soft.   Musculoskeletal: He exhibits no edema.   Neurological: He is alert and oriented to person, place, and time.   Skin: Skin is warm and dry.   Psychiatric: He has a normal mood and affect. His behavior is normal. Judgment and thought content normal.   Vitals reviewed.    Personal Diagnostic Review  X-Ray Chest PA And Lateral  Narrative: EXAMINATION:  XR CHEST PA AND LATERAL    CLINICAL HISTORY:  Cough    TECHNIQUE:  PA and lateral views of the chest were performed.    COMPARISON:  05/06/2019    FINDINGS:  The lungs are clear and free of infiltrate.  No pleural effusion or pneumothorax. The heart is not enlarged. There is mild chronic wedging of a few midthoracic vertebral bodies.  Impression: 1.  No acute cardiopulmonary process.    Electronically signed by: Carlos García DO  Date:    06/03/2019  Time:    08:15    Addendum 6/4/2019 4:48 p.m.  Spirometry 6/4/2019 normal airflow.  No " bronchodilator response. FEV1 97.9 % predicted.  Advised of results via my [facility] portal.    Assessment:     1. Mild persistent asthma without complication    2. Cough    3. Cough variant asthma    4. Non-seasonal allergic rhinitis due to other allergic trigger    5. Post-nasal drip      Orders Placed This Encounter   Procedures    Spirometry with/without bronchodilator     Standing Status:   Future     Number of Occurrences:   1     Standing Expiration Date:   6/3/2020     Medication List with Changes/Refills   New Medications    ALBUTEROL SULFATE (PROAIR RESPICLICK) 90 MCG/ACTUATION AEPB    Inhale 180 mcg into the lungs every 4 (four) hours as needed (cough or wheezing). Rescue    FLUTICASONE PROPIONATE (FLONASE) 50 MCG/ACTUATION NASAL SPRAY    2 sprays (100 mcg total) by Each Nare route once daily.    MONTELUKAST (SINGULAIR) 10 MG TABLET    Take 1 tablet (10 mg total) by mouth every evening.   Current Medications    XARELTO 20 MG TAB    TAKE 1 TABLET BY MOUTH EVERY DAY    ZOLPIDEM (AMBIEN) 10 MG TAB    Take 10 mg by mouth nightly as needed.   Discontinued Medications    AZITHROMYCIN (Z-CHIDI) 250 MG TABLET    Take 1 tablet (250 mg total) by mouth once daily. Take first 2 tablets together, then 1 every day until finished.    NAPROXEN (NAPROSYN) 375 MG TABLET    Take 1 tablet (375 mg total) by mouth 2 (two) times daily with meals.     Plan:   Discussed diagnosis, its evaluation, treatment and usual course. All questions answered.  Problem List Items Addressed This Visit     Mild persistent asthma without complication - Primary    Overview     Diagnosed as a child.  6/4/2019 spirometry normal airflow.  No bronchodilator effect.  FEV1 97.9 % predicted.         Current Assessment & Plan     Diagnosed as a child.  6/4/2019 spirometry normal airflow.  No bronchodilator effect.  FEV1 97.9 % predicted.  The adequate intermittent cough.  Begin albuterol inhaler.  Begin Singulair and Flonase for upper respiratory symptoms  of postnasal drip, allergic rhinitis.  Consideration to add on ICS controller depending on results of treatment         Relevant Medications    montelukast (SINGULAIR) 10 mg tablet    albuterol sulfate (PROAIR RESPICLICK) 90 mcg/actuation AePB    Other Relevant Orders    Spirometry with/without bronchodilator (Completed)      Other Visit Diagnoses     Cough        Relevant Orders    Spirometry with/without bronchodilator (Completed)    Cough variant asthma        Relevant Medications    montelukast (SINGULAIR) 10 mg tablet    albuterol sulfate (PROAIR RESPICLICK) 90 mcg/actuation AePB    Non-seasonal allergic rhinitis due to other allergic trigger        Relevant Medications    fluticasone propionate (FLONASE) 50 mcg/actuation nasal spray    montelukast (SINGULAIR) 10 mg tablet    Post-nasal drip        Relevant Medications    fluticasone propionate (FLONASE) 50 mcg/actuation nasal spray        Plan summary  Mild intermittent cough chronic, cough is improving since recent bronchitis flare.  Spirometry is schedule for tomorrow to obtain baseline Anastacio  Decision for albuterol rescue, begin Singulair and Flonase with complaint of upper respiratory symptoms and postnasal drip.  Consideration to add on ICS controller depending on results of treatment.  Chest x-ray is clear  Lungs are clear to auscultation.  Addendum spirometry 6/4/2019 normal airflow.  No bronchodilator response. normal spirometry.     Follow up in about 3 weeks (around 6/24/2019) for Asthma .

## 2019-06-04 ENCOUNTER — CLINICAL SUPPORT (OUTPATIENT)
Dept: PULMONOLOGY | Facility: CLINIC | Age: 54
End: 2019-06-04
Payer: COMMERCIAL

## 2019-06-04 DIAGNOSIS — J45.30 MILD PERSISTENT ASTHMA WITHOUT COMPLICATION: ICD-10-CM

## 2019-06-04 DIAGNOSIS — R05.9 COUGH: ICD-10-CM

## 2019-06-04 PROCEDURE — 94060 EVALUATION OF WHEEZING: CPT | Mod: S$GLB,,, | Performed by: INTERNAL MEDICINE

## 2019-06-04 PROCEDURE — 94060 PR EVAL OF BRONCHOSPASM: ICD-10-PCS | Mod: S$GLB,,, | Performed by: INTERNAL MEDICINE

## 2019-06-04 NOTE — ASSESSMENT & PLAN NOTE
Diagnosed as a child.  6/4/2019 spirometry normal airflow.  No bronchodilator effect.  FEV1 97.9 % predicted.  The adequate intermittent cough.  Begin albuterol inhaler.  Begin Singulair and Flonase for upper respiratory symptoms of postnasal drip, allergic rhinitis.  Consideration to add on ICS controller depending on results of treatment

## 2019-06-05 LAB
BRPFT: ABNORMAL
FEF 25 75 CHG: 32.7 %
FEF 25 75 LLN: 1.37
FEF 25 75 POST REF: 158.6 %
FEF 25 75 PRE REF: 119.6 %
FEF 25 75 REF: 3.09
FET100 CHG: -6.4 %
FEV1 CHG: 8.7 %
FEV1 FVC CHG: 4.7 %
FEV1 FVC LLN: 67
FEV1 FVC POST REF: 106.2 %
FEV1 FVC PRE REF: 101.4 %
FEV1 FVC REF: 78
FEV1 LLN: 2.66
FEV1 POST REF: 106.3 %
FEV1 PRE REF: 97.9 %
FEV1 REF: 3.6
FVC CHG: 3.8 %
FVC LLN: 3.48
FVC POST REF: 99.8 %
FVC PRE REF: 96.2 %
FVC REF: 4.63
PEF CHG: 9.4 %
PEF LLN: 6.63
PEF POST REF: 123.2 %
PEF PRE REF: 112.6 %
PEF REF: 9.6
POST FEF 25 75: 4.9 L/S (ref 1.37–4.81)
POST FET 100: 10.79 SEC
POST FEV1 FVC: 82.89 % (ref 67.01–89.08)
POST FEV1: 3.83 L (ref 2.66–4.55)
POST FVC: 4.62 L (ref 3.48–5.78)
POST PEF: 11.83 L/S (ref 6.63–12.57)
PRE FEF 25 75: 3.69 L/S (ref 1.37–4.81)
PRE FET 100: 11.54 SEC
PRE FEV1 FVC: 79.16 % (ref 67.01–89.08)
PRE FEV1: 3.53 L (ref 2.66–4.55)
PRE FVC: 4.45 L (ref 3.48–5.78)
PRE PEF: 10.81 L/S (ref 6.63–12.57)

## 2019-06-21 ENCOUNTER — TELEPHONE (OUTPATIENT)
Dept: PULMONOLOGY | Facility: CLINIC | Age: 54
End: 2019-06-21

## 2019-06-21 DIAGNOSIS — J45.30 MILD PERSISTENT ASTHMA WITHOUT COMPLICATION: Primary | ICD-10-CM

## 2019-06-23 ENCOUNTER — HOSPITAL ENCOUNTER (EMERGENCY)
Facility: HOSPITAL | Age: 54
Discharge: HOME | End: 2019-06-23
Attending: FAMILY MEDICINE
Payer: COMMERCIAL

## 2019-06-23 VITALS
HEIGHT: 74 IN | RESPIRATION RATE: 18 BRPM | DIASTOLIC BLOOD PRESSURE: 80 MMHG | TEMPERATURE: 98 F | SYSTOLIC BLOOD PRESSURE: 138 MMHG | HEART RATE: 77 BPM | BODY MASS INDEX: 26.66 KG/M2 | WEIGHT: 207.69 LBS | OXYGEN SATURATION: 97 %

## 2019-06-23 DIAGNOSIS — K40.90 REDUCIBLE INGUINAL HERNIA: Primary | ICD-10-CM

## 2019-06-23 PROCEDURE — 99282 EMERGENCY DEPT VISIT SF MDM: CPT

## 2019-06-24 ENCOUNTER — OFFICE VISIT (OUTPATIENT)
Dept: SURGERY | Facility: CLINIC | Age: 54
End: 2019-06-24
Payer: COMMERCIAL

## 2019-06-24 VITALS
TEMPERATURE: 98 F | DIASTOLIC BLOOD PRESSURE: 85 MMHG | WEIGHT: 207 LBS | HEART RATE: 74 BPM | BODY MASS INDEX: 26.58 KG/M2 | SYSTOLIC BLOOD PRESSURE: 137 MMHG

## 2019-06-24 DIAGNOSIS — K40.90 NON-RECURRENT INGUINAL HERNIA OF RIGHT SIDE WITHOUT OBSTRUCTION OR GANGRENE: Primary | ICD-10-CM

## 2019-06-24 DIAGNOSIS — D68.52 HETEROZYGOUS FOR PROTHROMBIN G20210A MUTATION: ICD-10-CM

## 2019-06-24 PROCEDURE — 99204 PR OFFICE/OUTPT VISIT, NEW, LEVL IV, 45-59 MIN: ICD-10-PCS | Mod: S$GLB,,, | Performed by: SURGERY

## 2019-06-24 PROCEDURE — 99999 PR PBB SHADOW E&M-EST. PATIENT-LVL IV: CPT | Mod: PBBFAC,,, | Performed by: SURGERY

## 2019-06-24 PROCEDURE — 99204 OFFICE O/P NEW MOD 45 MIN: CPT | Mod: S$GLB,,, | Performed by: SURGERY

## 2019-06-24 PROCEDURE — 99999 PR PBB SHADOW E&M-EST. PATIENT-LVL IV: ICD-10-PCS | Mod: PBBFAC,,, | Performed by: SURGERY

## 2019-06-24 RX ORDER — LIDOCAINE HYDROCHLORIDE 10 MG/ML
1 INJECTION, SOLUTION EPIDURAL; INFILTRATION; INTRACAUDAL; PERINEURAL ONCE
Status: DISCONTINUED | OUTPATIENT
Start: 2019-06-24 | End: 2022-02-22

## 2019-06-24 RX ORDER — ONDANSETRON 4 MG/1
8 TABLET, ORALLY DISINTEGRATING ORAL EVERY 8 HOURS PRN
Status: CANCELLED | OUTPATIENT
Start: 2019-06-24

## 2019-06-24 NOTE — DISCHARGE INSTRUCTIONS
Regarding HERNIA, I recommend that the patient: avoid lifting, bending, and straining; do not stand for long periods of time; cough gently when needed; support hernia by holding your hand or a pillow over it when coughing; limit sexual intercourse; avoid straining when having bowel movements; eat foods high in fiber (i.e., whole grains, bran, cereals, and uncooked fruit and vegetables)  and drink at least 8 glasses of water a day; avoid using enemas or a laxatives; and do not wear anything tight over the hernia unless instructed to wear elastic support to keep the hernia in place).  Contact primary care provider for:  new symptoms of nausea and vomiting;  constipation or  bloody bowel movements; enlarging hernia; any questions or concerns related to the condition or treatment.  Advised patient to seek immediate care from primary care provider or the nearest emergency department if abdominal pain worsens; a fever develops; the hernia remains outside the abdomen and is painful, swollen, or feels hard; or if unable to pass gas or have a bowel movement.

## 2019-06-24 NOTE — H&P (VIEW-ONLY)
Patient ID: Jean-Paul Reeves is a 54 y.o. male.    Right inguinal hernia    Chief Complaint: Consult and Hernia      HPI:  Patient has noticed a mass in his right groin about 2 weeks ago.  He was traveling in [place] that time.  He presented to the emergency room because of increasing pain.  He was evaluated there and sent for surgical evaluation.    The patient is on Xarelto for a hypercoagulable state.  He has stopped his Xarelto 2 days ago.  His stop the in the past for procedures without difficulty.  He does have a history of pulmonary embolism      Review of Systems   Constitutional: Negative.    HENT: Negative.    Eyes: Negative.    Respiratory: Negative.    Cardiovascular: Negative.    Gastrointestinal: Negative.         Right groin bulge   Endocrine: Negative.    Genitourinary: Negative.    Musculoskeletal: Negative.    Skin: Negative.    Allergic/Immunologic: Negative.    Neurological: Negative.    Hematological: Negative.    Psychiatric/Behavioral: Negative.        Current Outpatient Medications   Medication Sig Dispense Refill    albuterol sulfate (PROAIR RESPICLICK) 90 mcg/actuation AePB Inhale 180 mcg into the lungs every 4 (four) hours as needed (cough or wheezing). Rescue 1 each 11    fluticasone propionate (FLONASE) 50 mcg/actuation nasal spray 2 sprays (100 mcg total) by Each Nare route once daily. 16 g 11    montelukast (SINGULAIR) 10 mg tablet Take 1 tablet (10 mg total) by mouth every evening. 90 tablet 3    XARELTO 20 mg Tab TAKE 1 TABLET BY MOUTH EVERY DAY 30 tablet 5    zolpidem (AMBIEN) 10 mg Tab Take 10 mg by mouth nightly as needed.       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     Pulmonary embolism     Tachycardia        Past Surgical History:   Procedure Laterality Date    APPENDECTOMY         Family History   Problem Relation Age of Onset    Cancer Mother        Social History     Socioeconomic History    Marital status:      Spouse name: Not on file    Number of children: Not on file    Years of education: Not on file    Highest education level: Not on file   Occupational History    Not on file   Social Needs    Financial resource strain: Not on file    Food insecurity:     Worry: Not on file     Inability: Not on file    Transportation needs:     Medical: Not on file     Non-medical: Not on file   Tobacco Use    Smoking status: Never Smoker    Smokeless tobacco: Never Used   Substance and Sexual Activity    Alcohol use: Yes     Comment: occasionally    Drug use: No    Sexual activity: Not on file   Lifestyle    Physical activity:     Days per week: Not on file     Minutes per session: Not on file    Stress: Not on file   Relationships    Social connections:     Talks on phone: Not on file     Gets together: Not on file     Attends Quaker service: Not on file     Active member of club or organization: Not on file     Attends meetings of clubs or organizations: Not on file     Relationship status: Not on file   Other Topics Concern    Not on file   Social History Narrative    Not on file       Vitals:    06/24/19 1052   BP: 137/85   Pulse: 74   Temp: 97.6 °F (36.4 °C)       Physical Exam   Constitutional: He is oriented to person, place, and time. He appears well-developed and well-nourished. No distress.   HENT:   Head: Normocephalic and atraumatic.   Eyes: Pupils are equal, round, and reactive to light. No scleral icterus.   Neck: Normal range of motion. Neck supple. No JVD present. No tracheal deviation present. No thyromegaly present.   Cardiovascular: Normal rate, regular rhythm and normal heart sounds.    Pulmonary/Chest: Effort normal and breath sounds normal.   Abdominal: Soft. Bowel sounds are normal. He exhibits no distension and no mass. There is no tenderness. There is no rebound and no guarding. A hernia (Reducible right inguinal hernia) is present.   Musculoskeletal: Normal range of motion.   Lymphadenopathy:     He has no cervical adenopathy.   Neurological: He is alert and oriented to person, place, and time.   Skin: Skin is warm and dry.   Psychiatric: He has a normal mood and affect. His behavior is normal. Judgment and thought content normal.     Recent labs and EKG were reviewed    Assessment & Plan:    Non-recurrent inguinal hernia of right side without obstruction or gangrene  -     Case Request Operating Room: REPAIR, HERNIA, INGUINAL, WITHOUT HISTORY OF PRIOR REPAIR, AGE 3 YEARS OR OLDER  -     Insert peripheral IV; Standing  -     Full code; Standing   Open right inguinal hernia repair.    The need for surgery the rationale for using mesh was discussed.    Surgery be scheduled for tomorrow as he is currently off his Xarelto    Risk of hernia repair includes infection, bleeding, mesh infection, testicular atrophy, and pain or numbness in the groin that may be long-lasting.      Heterozygous for prothrombin [id number] mutation    Continue to hold his Xarelto, it can be restarted on Wednesday, the day after surgery

## 2019-06-24 NOTE — PATIENT INSTRUCTIONS
"Your hernia surgery scheduled for tomorrow.  The hospital should call you with the time of arrival.  If they do not call you please arrive between 12 and 12 30 at the surgery area of the hospital.    Please do not restart her Xarelto    No solid food after midnight tonight but you may have clear liquids up to 3 hr before the start time of your surgery.    Please take all your morning medications with the exception of the Xarelto.    There are no preop studies that we need    [facility]  Instructions for Patients and Families    You are invited to share your experience with me and my staff.  If you receive a survey in the mail, please return it at your convenience, or complete a brief survey on [employer].  We value your opinion!        Did you know that [facility] can be used to make appointments?  Just select "Schedule Appointment" under the "Visits" menu.    Notify you if any of your preop laboratories show abnormalities.  I    Before surgery:  The pre-op nurse from the hospital will call you before the day of your surgery to confirm your arrival time.  The time of your surgery may change due to emergencies or other unforeseen events.  Do not eat or drink anything after midnight the night before your procedure, except for clear liquids up to three hours before your surgery time, and sips of water with medication.  If you are not diabetic, it is recommended that you drink a glass of clear fruit juice (apple, grape, cranberry, not orange) three hours before your surgery time, but nothing after that.  If you are diabetic, you may have water or sugar-free clear liquids such as Crystal Light up to three hours before your surgery time.    Day of Surgery:  · You will go to a pre-op area where an IV will be started and you will speak to the anesthesiologist and surgeon.  · Your family will be updated throughout the operation.  After surgery, your family member may be taken to a private room " for consultation with the surgeon.  This is for the privacy of your medical information and does not necessarily mean there is anything wrong.    If your incisions have:  · Glue:  You may take a bath or shower immediately and wash your skin as you normally do.  The glue will eventually crumble or peel off. Do not let your incisions soak under water.  · Strips: Leave them on, but it is OK if they fall off on their own. It is OK to get them wet 48 hours after surgery.  · Bandage: You may remove it 2 days after your surgery, and then you may leave the incision open and take a shower or bath, unless otherwise instructed. If your bandage has clear plastic, you may shower with it on and then remove it 2 days after surgery.    Activities  · Walking is recommended after surgery; bed rest is not recommended unless specifically ordered.  · If you have had abdominal surgery, do not lift over 20 pounds for 4 weeks after surgery.  · If you have had hernia surgery, do not lift over 20 pounds for 6 weeks after surgery.  · You may drive when you are off your post-operative pain medication.  · Do not smoke after surgery, it decreases your ability to heal and increases the risk of infection and pneumonia.    Diet:  Drink lots of fluids after surgery.  You might not have much of an appetite at first, you may eat regular food when you feel ready, unless you are given special diet instructions.    Post-operative symptoms and medications  · It is safe to take over-the-counter medications for constipation, heartburn, sleep, or itching if needed.  Prescription pain medication may contain acetaminophen (Tylenol), so you should not take additional acetaminophen (Tylenol) at the same time as your pain medication.  · You may experience nausea, low fever/chills, and clear drainage from your incision, sometimes up to a month after surgery.  Notify our office if you have fever over 101 degrees, worsening redness around your incision, thick cloudy  "drainage, or inability to drink any liquids.  · You will experience some level of pain after surgery.  Your pain medication should help with the pain, but may not be able to eliminate it entirely.  Pain will decrease with time, and most pain will be gone by 4 to 6 weeks after surgery.  · We are not able to call in prescriptions for pain medication after hours or on weekends.  If your pain medication is ineffective or you will run out soon and need a refill, please call our office at [phone].  We are not able to replace pain medication that has been lost or stolen.    After surgery, you will either be discharged home or admitted to the hospital.  If you are admitted to the hospital, one of the surgeons or a physician assistant will see you once a day.  Due to scheduled surgery, we may see you in the afternoon or at night; however, your nurse is able to page us at any time.  If you feel there is a situation that is not being addressed properly, please dial [phone] from the phone in your room.    Follow-up appointment  · You will see your surgeon or a physician assistant in clinic for a follow-up appointment at either our [facility] (off [facility]) or [facility] (off [facility]) locations, usually between one and four weeks after your surgery.  · The hospital nurses can make your follow up appointment, or you can make it online at [email or web address] or call [phone].  · If you have a smartphone with the [employer] shawna, please let us know if you would like to do a phone visit instead of a post-op office visit.    If you are signed up for [facility], install the "[employer]" shawna to access your test results, send messages to your doctors, and schedule appointments from your smartphone!    "

## 2019-06-24 NOTE — PRE-PROCEDURE INSTRUCTIONS
Pre op instructions reviewed with patient's wife per phone.    To confirm, Your surgeon has instructed you:  Surgery is scheduled 06/25/19 at 1230.  Pre admit office to call afternoon prior to surgery with final arrival time.  If surgery is on Monday, Pre admit office to call Friday afternoon with with final arrival time      Please report to [facility] floor main lobby by 1100.      INSTRUCTIONS IMPORTANT!!!  ¨ No smoking after 12 midnight, the night before surgery.  ¨ No solid food after 12 midnight, but you may have clear liquids up until 3 hours prior to surgery.  This includes: grape, cranberry, and apple juice (not orange, and no coffee.)   ¨ OK to brush teeth, but no gum, candy or mints!    ¨ Take only these medicines with a small swallow of water-morning of surgery.  N/A  ____  Do not wear makeup, including mascara.  ____  No powder, lotions or creams to surgical area.  ____  Please remove all jewelry, including piercings and leave at home.  ____  No money or valuables needed. Please leave at home.  ____  Please bring identification and insurance information to hospital.  ____  If going home the same day, arrange for a ride home. You will not be able to   drive if Anesthesia was used.  ____  Children, under 12 years old, must remain in the waiting room with an adult.  They are not allowed in patient areas.  ____  Wear loose fitting clothing. Allow for dressings, bandages.  ____  Stop Aspirin, Ibuprofen, Motrin and Aleve at least 5-7 days before surgery, unless otherwise instructed by your doctor, or the nurse.   You MAY use Tylenol/acetaminophen until day of surgery.  ____  If you take diabetic medication, do not take am of surgery unless instructed by   Doctor.  ____ Stop taking any Fish Oil supplement or any Vitamins that contain Vitamin E at least 5 days prior to surgery.          Bathing Instructions-- The night before surgery and the morning prior to coming to the  hospital:   -Do not shave the surgical area.   -Shower and wash your hair and body as usual with your regular soap and shampoo.   -Rinse your hair and body completely.   -Use one packet of hibiclens to wash the surgical site (using your hand) gently for 5 minutes.  Do not scrub you skin too hard.   -Do not use hibiclens on your head, face, or genitals.   -Do not wash with regular soap after you use the hibiclens.   -Rinse your body thoroughly.   -Dry with clean, soft towel.  Do not use lotion, cream, deodorant, or powders on   the surgical site.    Use antibacterial soap in place of hibiclens if your surgery is on the head, face or genitals.         Surgical Site Infection    Prevention of surgical site infections:     -Keep incisions clean and dry.   -Do not soak/submerge incisions in water until completely healed.   -Do not apply lotions, powders, creams, or deodorants to site.   -Always make sure hands are cleaned with antibacterial soap/ alcohol-based   prior to touching the surgical site.  (This includes doctors, nurses, staff, and yourself.)    Signs and symptoms:   -Redness and pain around the area where you had surgery   -Drainage of cloudy fluid from your surgical wound   -Fever over 100.4  I have read or had read and explained to me, and understand the above information.

## 2019-06-24 NOTE — PROGRESS NOTES
Patient ID: Jean-Paul Reeves is a 54 y.o. male.    Right inguinal hernia    Chief Complaint: Consult and Hernia      HPI:  Patient has noticed a mass in his right groin about 2 weeks ago.  He was traveling in [place] that time.  He presented to the emergency room because of increasing pain.  He was evaluated there and sent for surgical evaluation.    The patient is on Xarelto for a hypercoagulable state.  He has stopped his Xarelto 2 days ago.  His stop the in the past for procedures without difficulty.  He does have a history of pulmonary embolism      Review of Systems   Constitutional: Negative.    HENT: Negative.    Eyes: Negative.    Respiratory: Negative.    Cardiovascular: Negative.    Gastrointestinal: Negative.         Right groin bulge   Endocrine: Negative.    Genitourinary: Negative.    Musculoskeletal: Negative.    Skin: Negative.    Allergic/Immunologic: Negative.    Neurological: Negative.    Hematological: Negative.    Psychiatric/Behavioral: Negative.        Current Outpatient Medications   Medication Sig Dispense Refill    albuterol sulfate (PROAIR RESPICLICK) 90 mcg/actuation AePB Inhale 180 mcg into the lungs every 4 (four) hours as needed (cough or wheezing). Rescue 1 each 11    fluticasone propionate (FLONASE) 50 mcg/actuation nasal spray 2 sprays (100 mcg total) by Each Nare route once daily. 16 g 11    montelukast (SINGULAIR) 10 mg tablet Take 1 tablet (10 mg total) by mouth every evening. 90 tablet 3    XARELTO 20 mg Tab TAKE 1 TABLET BY MOUTH EVERY DAY 30 tablet 5    zolpidem (AMBIEN) 10 mg Tab Take 10 mg by mouth nightly as needed.       Current Facility-Administered Medications   Medication Dose Route Frequency Provider Last Rate Last Dose    lidocaine (PF) 10 mg/ml (1%) injection 10 mg  1 mL Intradermal Once Amarjit Cardenas MD           Review of patient's allergies indicates:  No Known Allergies    Past Medical History:   Diagnosis Date    Blood clot in vein     Heterozygous for  prothrombin [id number] mutation     Pulmonary embolism     Tachycardia        Past Surgical History:   Procedure Laterality Date    APPENDECTOMY         Family History   Problem Relation Age of Onset    Cancer Mother        Social History     Socioeconomic History    Marital status:      Spouse name: Not on file    Number of children: Not on file    Years of education: Not on file    Highest education level: Not on file   Occupational History    Not on file   Social Needs    Financial resource strain: Not on file    Food insecurity:     Worry: Not on file     Inability: Not on file    Transportation needs:     Medical: Not on file     Non-medical: Not on file   Tobacco Use    Smoking status: Never Smoker    Smokeless tobacco: Never Used   Substance and Sexual Activity    Alcohol use: Yes     Comment: occasionally    Drug use: No    Sexual activity: Not on file   Lifestyle    Physical activity:     Days per week: Not on file     Minutes per session: Not on file    Stress: Not on file   Relationships    Social connections:     Talks on phone: Not on file     Gets together: Not on file     Attends Evangelical service: Not on file     Active member of club or organization: Not on file     Attends meetings of clubs or organizations: Not on file     Relationship status: Not on file   Other Topics Concern    Not on file   Social History Narrative    Not on file       Vitals:    06/24/19 1052   BP: 137/85   Pulse: 74   Temp: 97.6 °F (36.4 °C)       Physical Exam   Constitutional: He is oriented to person, place, and time. He appears well-developed and well-nourished. No distress.   HENT:   Head: Normocephalic and atraumatic.   Eyes: Pupils are equal, round, and reactive to light. No scleral icterus.   Neck: Normal range of motion. Neck supple. No JVD present. No tracheal deviation present. No thyromegaly present.   Cardiovascular: Normal rate, regular rhythm and normal heart sounds.    Pulmonary/Chest: Effort normal and breath sounds normal.   Abdominal: Soft. Bowel sounds are normal. He exhibits no distension and no mass. There is no tenderness. There is no rebound and no guarding. A hernia (Reducible right inguinal hernia) is present.   Musculoskeletal: Normal range of motion.   Lymphadenopathy:     He has no cervical adenopathy.   Neurological: He is alert and oriented to person, place, and time.   Skin: Skin is warm and dry.   Psychiatric: He has a normal mood and affect. His behavior is normal. Judgment and thought content normal.     Recent labs and EKG were reviewed    Assessment & Plan:    Non-recurrent inguinal hernia of right side without obstruction or gangrene  -     Case Request Operating Room: REPAIR, HERNIA, INGUINAL, WITHOUT HISTORY OF PRIOR REPAIR, AGE 3 YEARS OR OLDER  -     Insert peripheral IV; Standing  -     Full code; Standing   Open right inguinal hernia repair.    The need for surgery the rationale for using mesh was discussed.    Surgery be scheduled for tomorrow as he is currently off his Xarelto    Risk of hernia repair includes infection, bleeding, mesh infection, testicular atrophy, and pain or numbness in the groin that may be long-lasting.      Heterozygous for prothrombin [id number] mutation    Continue to hold his Xarelto, it can be restarted on Wednesday, the day after surgery

## 2019-06-24 NOTE — ED PROVIDER NOTES
SCRIBE #1 NOTE: I, Angela Edge, am scribing for, and in the presence of, Pamela Diaz MD. I have scribed the entire note.      History      Chief Complaint   Patient presents with    Swelling     swelling to right groin area x 2 weeks.        Review of patient's allergies indicates:  No Known Allergies     HPI   HPI    6/23/2019, 10:17 PM   History obtained from the patient      History of Present Illness: Jean-Paul Reeves is a 54 y.o. male patient who presents to the Emergency Department for swelling to right groin which onset gradually 2 weeks ago. Symptoms are constant and moderate in severity. Pt reports lifting heavy objects at work. Pt reports he is able to reduce swelling to site but worsens with exertion. No other mitigating or exacerbating factors reported. Patient denies any fever, chills, nausea, emesis, dysuria, hematuria, diarrhea, constipation, flank pain, testicular pain, penile swelling, and all other sxs at this time. No further complaints or concerns at this time.         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     Pulmonary embolism     Tachycardia        Past Surgical History:  Past Surgical History:   Procedure Laterality Date    APPENDECTOMY      REPAIR, HERNIA, INGUINAL, WITHOUT HISTORY OF PRIOR REPAIR, AGE 5 YEARS OR OLDER Right 6/25/2019    Performed by Amarjit Cardenas MD at [facility]         Family History:  Family History   Problem Relation Age of Onset    Cancer Mother        Social History:  Social History     Tobacco Use    Smoking status: Never Smoker    Smokeless tobacco: Never Used   Substance and Sexual Activity    Alcohol use: Yes     Comment: occasionally    Drug use: No    Sexual activity: Not given       ROS   Review of Systems    Constitutional: Negative for appetite change, chills and fever.   HENT: Negative for congestion, sore throat and trouble swallowing.    Respiratory: Negative for cough and shortness of breath.    Cardiovascular: Negative for chest pain.   Gastrointestinal: Negative for abdominal pain, blood in stool, constipation, diarrhea, nausea and vomiting.   Genitourinary: Negative for dysuria, flank pain, hematuria, penile swelling and testicular pain.        (+) R groin swelling   Musculoskeletal: Negative for back pain and neck pain.   Skin: Negative for rash and wound.   Neurological: Negative for dizziness, weakness and numbness.   All other systems reviewed and are negative.    Physical Exam      Initial Vitals [06/23/19 2202]   BP Pulse Resp Temp SpO2   138/80 77 18 98.1 °F (36.7 °C) 97 %      MAP       --          Physical Exam  Nursing Notes and Vital Signs Reviewed.  Constitutional: Patient is in no acute distress. Well-developed and well-nourished.  Head: Atraumatic. Normocephalic.  Eyes: PERRL. EOM intact. Conjunctivae are not pale. No scleral icterus.  ENT: Mucous membranes are moist. Oropharynx is clear and symmetric.    Neck: Supple. Full ROM. No lymphadenopathy.  Cardiovascular: Regular rate. Regular rhythm. No murmurs, rubs, or gallops. Distal pulses are 2+ and symmetric.  Pulmonary/Chest: No respiratory distress. Clear to auscultation bilaterally. No wheezing or rales.  Abdominal: Soft and non-distended.  There is no tenderness.  No rebound, guarding, or rigidity. Good bowel sounds.  : External inspection is normal.  Penis is uncircumcised. No erythema, rash, or lesions. No penile discharge. Normal bilateral testicular lie and position. Scrotum and testes appear normal with no discoloration. No scrotal, testicular, or epididymal tenderness. R inguinal hernia is present but easily reducible.  Genitourinary: No CVA tenderness.   Musculoskeletal: Moves all extremities. No obvious deformities. No edema. No  "calf tenderness.  Skin: Warm and dry.  Neurological:  Alert, awake, and appropriate.  Normal speech.  No acute focal neurological deficits are appreciated.  Psychiatric: Normal affect. Good eye contact. Appropriate in content.    ED Course    Procedures  ED Vital Signs:  Vitals:    06/23/19 2202   BP: 138/80   Pulse: 77   Resp: 18   Temp: 98.1 °F (36.7 °C)   TempSrc: Oral   SpO2: 97%   Weight: 94.2 kg (207 lb 10.8 oz)   Height: 6' 2" (1.88 m)                The Emergency Provider reviewed the vital signs and test results, which are outlined above.    ED Discussion     10:22 PM Advised pt to f/u with General Surgery outpatient for consultation if sxs worsen. Pt is awake, alert, and in NAD at this time. Discussed with pt all pertinent ED information and results. Discussed pt dx and plan of tx. Gave pt all f/u and return to the ED instructions. All questions and concerns were addressed at this time. Pt expresses understanding of information and instructions, and is comfortable with plan to discharge. Pt is stable for discharge.      I discussed with patient and/or family/caretaker that evaluation in the ED does not suggest any emergent or life threatening medical conditions requiring immediate intervention beyond what was provided in the ED, and I believe patient is safe for discharge.  Regardless, an unremarkable evaluation in the ED does not preclude the development or presence of a serious of life threatening condition. As such, patient was instructed to return immediately for any worsening or change in current symptoms.           ED Medication(s):  Medications - No data to display    Discharge Medication List as of 6/23/2019 10:22 PM          Follow-up Information     Milind Soto MD. Schedule an appointment as soon as possible for a visit in 3 days.    Specialties:  General Surgery, Bariatrics  Contact information:  [id number]  [place] [id number] [phone]                     Medical Decision Making "              Scribe Attestation:   Scribe #1: I performed the above scribed service and the documentation accurately describes the services I performed. I attest to the accuracy of the note.    Attending:   Physician Attestation Statement for Scribe #1: I, Pamela Diaz MD, personally performed the services described in this documentation, as scribed by Angela Edge, in my presence, and it is both accurate and complete.          Clinical Impression       ICD-10-CM ICD-9-CM   1. Reducible inguinal hernia K40.90 550.90       Disposition:   Disposition: Discharged  Condition: Stable    I have reviewed the provider's instructions with the patient, answering all questions to his satisfaction.    Jean-Paul Reeves was given education on their disease process and medications.    Regarding HERNIA, I recommend that the patient: avoid lifting, bending, and straining; do not stand for long periods of time; cough gently when needed; support hernia by holding your hand or a pillow over it when coughing; limit sexual intercourse; avoid straining when having bowel movements; eat foods high in fiber (i.e., whole grains, bran, cereals, and uncooked fruit and vegetables)  and drink at least 8 glasses of water a day; avoid using enemas or a laxatives; and do not wear anything tight over the hernia unless instructed to wear elastic support to keep the hernia in place).  Contact primary care provider for:  new symptoms of nausea and vomiting;  constipation or  bloody bowel movements; enlarging hernia; any questions or concerns related to the condition or treatment.  Advised patient to seek immediate care from primary care provider or the nearest emergency department if abdominal pain worsens; a fever develops; the hernia remains outside the abdomen and is painful, swollen, or feels hard; or if unable to pass gas or have a bowel movement.    Portions of this note may have been created with voice recognition software. Occasional  ""wrong-word" or "sound-a-like" substitutions may have occurred due to the inherent limitations of voice recognition software. Please, read the note carefully and recognize, using context, where substitutions have occurred.          Pamela Diaz MD  06/26/19 0064    "

## 2019-06-25 ENCOUNTER — ANESTHESIA (OUTPATIENT)
Dept: SURGERY | Facility: HOSPITAL | Age: 54
End: 2019-06-25
Payer: COMMERCIAL

## 2019-06-25 ENCOUNTER — ANESTHESIA EVENT (OUTPATIENT)
Dept: SURGERY | Facility: HOSPITAL | Age: 54
End: 2019-06-25
Payer: COMMERCIAL

## 2019-06-25 ENCOUNTER — HOSPITAL ENCOUNTER (OUTPATIENT)
Facility: HOSPITAL | Age: 54
Discharge: HOME | End: 2019-06-25
Attending: SURGERY | Admitting: SURGERY
Payer: COMMERCIAL

## 2019-06-25 VITALS
BODY MASS INDEX: 25.92 KG/M2 | OXYGEN SATURATION: 98 % | RESPIRATION RATE: 18 BRPM | DIASTOLIC BLOOD PRESSURE: 78 MMHG | WEIGHT: 201.94 LBS | HEIGHT: 74 IN | HEART RATE: 65 BPM | TEMPERATURE: 98 F | SYSTOLIC BLOOD PRESSURE: 131 MMHG

## 2019-06-25 DIAGNOSIS — D68.59 PRIMARY HYPERCOAGULABLE STATE: Primary | ICD-10-CM

## 2019-06-25 DIAGNOSIS — D68.52 HETEROZYGOUS FOR PROTHROMBIN G20210A MUTATION: ICD-10-CM

## 2019-06-25 DIAGNOSIS — K40.90 NON-RECURRENT INGUINAL HERNIA OF RIGHT SIDE WITHOUT OBSTRUCTION OR GANGRENE: ICD-10-CM

## 2019-06-25 PROCEDURE — 36000707: Performed by: SURGERY

## 2019-06-25 PROCEDURE — 71000016 HC POSTOP RECOV ADDL HR: Performed by: SURGERY

## 2019-06-25 PROCEDURE — 37000008 HC ANESTHESIA 1ST 15 MINUTES: Performed by: SURGERY

## 2019-06-25 PROCEDURE — 71000033 HC RECOVERY, INTIAL HOUR: Performed by: SURGERY

## 2019-06-25 PROCEDURE — 49505 PRP I/HERN INIT REDUC >5 YR: CPT | Mod: RT,,, | Performed by: SURGERY

## 2019-06-25 PROCEDURE — 37000009 HC ANESTHESIA EA ADD 15 MINS: Performed by: SURGERY

## 2019-06-25 PROCEDURE — 71000015 HC POSTOP RECOV 1ST HR: Performed by: SURGERY

## 2019-06-25 PROCEDURE — 63600175 PHARM REV CODE 636 W HCPCS: Performed by: SURGERY

## 2019-06-25 PROCEDURE — 25000003 PHARM REV CODE 250: Performed by: SURGERY

## 2019-06-25 PROCEDURE — 63600175 PHARM REV CODE 636 W HCPCS: Performed by: NURSE ANESTHETIST, CERTIFIED REGISTERED

## 2019-06-25 PROCEDURE — 63600175 PHARM REV CODE 636 W HCPCS: Performed by: ANESTHESIOLOGY

## 2019-06-25 PROCEDURE — 25000003 PHARM REV CODE 250: Performed by: NURSE ANESTHETIST, CERTIFIED REGISTERED

## 2019-06-25 PROCEDURE — 36000706: Performed by: SURGERY

## 2019-06-25 PROCEDURE — C1781 MESH (IMPLANTABLE): HCPCS | Performed by: SURGERY

## 2019-06-25 PROCEDURE — 49505 PR REPAIR ING HERNIA,5+Y/O,REDUCIBL: ICD-10-PCS | Mod: RT,,, | Performed by: SURGERY

## 2019-06-25 PROCEDURE — 71000039 HC RECOVERY, EACH ADD'L HOUR: Performed by: SURGERY

## 2019-06-25 DEVICE — PLUG MEDIUM: Type: IMPLANTABLE DEVICE | Site: GROIN | Status: FUNCTIONAL

## 2019-06-25 RX ORDER — HYDROMORPHONE HYDROCHLORIDE 1 MG/ML
1 INJECTION, SOLUTION INTRAMUSCULAR; INTRAVENOUS; SUBCUTANEOUS EVERY 4 HOURS PRN
Status: DISCONTINUED | OUTPATIENT
Start: 2019-06-25 | End: 2019-06-25 | Stop reason: HOSPADM

## 2019-06-25 RX ORDER — HYDROCODONE BITARTRATE AND ACETAMINOPHEN 5; 325 MG/1; MG/1
1 TABLET ORAL EVERY 6 HOURS PRN
Qty: 20 TABLET | Refills: 0 | Status: SHIPPED | OUTPATIENT
Start: 2019-06-25 | End: 2021-07-23

## 2019-06-25 RX ORDER — ONDANSETRON 8 MG/1
8 TABLET, ORALLY DISINTEGRATING ORAL EVERY 8 HOURS PRN
Status: DISCONTINUED | OUTPATIENT
Start: 2019-06-25 | End: 2019-06-25 | Stop reason: HOSPADM

## 2019-06-25 RX ORDER — CEFAZOLIN SODIUM 2 G/50ML
2 SOLUTION INTRAVENOUS
Status: COMPLETED | OUTPATIENT
Start: 2019-06-25 | End: 2019-06-25

## 2019-06-25 RX ORDER — HYDROMORPHONE HYDROCHLORIDE 2 MG/ML
0.2 INJECTION, SOLUTION INTRAMUSCULAR; INTRAVENOUS; SUBCUTANEOUS EVERY 5 MIN PRN
Status: DISCONTINUED | OUTPATIENT
Start: 2019-06-25 | End: 2019-06-25 | Stop reason: HOSPADM

## 2019-06-25 RX ORDER — ONDANSETRON 2 MG/ML
4 INJECTION INTRAMUSCULAR; INTRAVENOUS ONCE AS NEEDED
Status: COMPLETED | OUTPATIENT
Start: 2019-06-25 | End: 2019-06-25

## 2019-06-25 RX ORDER — LIDOCAINE HYDROCHLORIDE 10 MG/ML
INJECTION, SOLUTION EPIDURAL; INFILTRATION; INTRACAUDAL; PERINEURAL
Status: DISCONTINUED | OUTPATIENT
Start: 2019-06-25 | End: 2019-06-25

## 2019-06-25 RX ORDER — PROPOFOL 10 MG/ML
VIAL (ML) INTRAVENOUS
Status: DISCONTINUED | OUTPATIENT
Start: 2019-06-25 | End: 2019-06-25

## 2019-06-25 RX ORDER — MEPERIDINE HYDROCHLORIDE 50 MG/ML
12.5 INJECTION INTRAMUSCULAR; INTRAVENOUS; SUBCUTANEOUS ONCE AS NEEDED
Status: DISCONTINUED | OUTPATIENT
Start: 2019-06-25 | End: 2019-06-25 | Stop reason: HOSPADM

## 2019-06-25 RX ORDER — HYDROCODONE BITARTRATE AND ACETAMINOPHEN 5; 325 MG/1; MG/1
1 TABLET ORAL EVERY 6 HOURS PRN
Status: DISCONTINUED | OUTPATIENT
Start: 2019-06-25 | End: 2019-06-25 | Stop reason: HOSPADM

## 2019-06-25 RX ORDER — SODIUM CHLORIDE 0.9 % (FLUSH) 0.9 %
3 SYRINGE (ML) INJECTION
Status: DISCONTINUED | OUTPATIENT
Start: 2019-06-25 | End: 2019-06-25 | Stop reason: HOSPADM

## 2019-06-25 RX ORDER — BUPIVACAINE HYDROCHLORIDE 2.5 MG/ML
INJECTION, SOLUTION EPIDURAL; INFILTRATION; INTRACAUDAL
Status: DISCONTINUED | OUTPATIENT
Start: 2019-06-25 | End: 2019-06-25 | Stop reason: HOSPADM

## 2019-06-25 RX ORDER — MIDAZOLAM HYDROCHLORIDE 1 MG/ML
INJECTION, SOLUTION INTRAMUSCULAR; INTRAVENOUS
Status: DISCONTINUED | OUTPATIENT
Start: 2019-06-25 | End: 2019-06-25

## 2019-06-25 RX ORDER — FENTANYL CITRATE 50 UG/ML
INJECTION, SOLUTION INTRAMUSCULAR; INTRAVENOUS
Status: DISCONTINUED | OUTPATIENT
Start: 2019-06-25 | End: 2019-06-25

## 2019-06-25 RX ORDER — ONDANSETRON 8 MG/1
8 TABLET, ORALLY DISINTEGRATING ORAL EVERY 8 HOURS PRN
Status: DISCONTINUED | OUTPATIENT
Start: 2019-06-25 | End: 2019-06-25 | Stop reason: SDUPTHER

## 2019-06-25 RX ORDER — SODIUM CHLORIDE, SODIUM LACTATE, POTASSIUM CHLORIDE, CALCIUM CHLORIDE 600; 310; 30; 20 MG/100ML; MG/100ML; MG/100ML; MG/100ML
INJECTION, SOLUTION INTRAVENOUS CONTINUOUS PRN
Status: DISCONTINUED | OUTPATIENT
Start: 2019-06-25 | End: 2019-06-25

## 2019-06-25 RX ADMIN — HYDROMORPHONE HYDROCHLORIDE 0.2 MG: 2 INJECTION, SOLUTION INTRAMUSCULAR; INTRAVENOUS; SUBCUTANEOUS at 03:06

## 2019-06-25 RX ADMIN — FENTANYL CITRATE 100 MCG: 50 INJECTION, SOLUTION INTRAMUSCULAR; INTRAVENOUS at 01:06

## 2019-06-25 RX ADMIN — HYDROCODONE BITARTRATE AND ACETAMINOPHEN 1 TABLET: 5; 325 TABLET ORAL at 03:06

## 2019-06-25 RX ADMIN — ONDANSETRON 4 MG: 2 INJECTION INTRAMUSCULAR; INTRAVENOUS at 03:06

## 2019-06-25 RX ADMIN — MIDAZOLAM 2 MG: 1 INJECTION INTRAMUSCULAR; INTRAVENOUS at 01:06

## 2019-06-25 RX ADMIN — CEFAZOLIN SODIUM 2 G: 2 SOLUTION INTRAVENOUS at 01:06

## 2019-06-25 RX ADMIN — FENTANYL CITRATE 50 MCG: 50 INJECTION, SOLUTION INTRAMUSCULAR; INTRAVENOUS at 02:06

## 2019-06-25 RX ADMIN — PROPOFOL 200 MG: 10 INJECTION, EMULSION INTRAVENOUS at 01:06

## 2019-06-25 RX ADMIN — SODIUM CHLORIDE, SODIUM LACTATE, POTASSIUM CHLORIDE, AND CALCIUM CHLORIDE: 600; 310; 30; 20 INJECTION, SOLUTION INTRAVENOUS at 01:06

## 2019-06-25 RX ADMIN — LIDOCAINE HYDROCHLORIDE 50 MG: 10 INJECTION, SOLUTION EPIDURAL; INFILTRATION; INTRACAUDAL; PERINEURAL at 01:06

## 2019-06-25 NOTE — ANESTHESIA RELEASE NOTE
"Anesthesia Release from PACU Note    Patient: Jean-Paul Reeves    Procedure(s) Performed: Procedure(s) (LRB):  REPAIR, HERNIA, INGUINAL, WITHOUT HISTORY OF PRIOR REPAIR, AGE 5 YEARS OR OLDER (Right)    Anesthesia type: general    Post pain: Adequate analgesia    Post assessment: no apparent anesthetic complications, tolerated procedure well and no evidence of recall    Last Vitals:   Visit Vitals  /76   Pulse 63   Temp 36.6 °C (97.8 °F) (Temporal)   Resp 16   Ht 6' 2" (1.88 m)   Wt 91.6 kg (201 lb 15.1 oz)   SpO2 99%   BMI 25.93 kg/m²       Post vital signs: stable    Level of consciousness: awake, alert  and oriented    Nausea/Vomiting: no nausea/no vomiting    Complications: none    Airway Patency: patent    Respiratory: unassisted, spontaneous ventilation, room air    Cardiovascular: stable and blood pressure at baseline    Hydration: euvolemic  "

## 2019-06-25 NOTE — TRANSFER OF CARE
"Anesthesia Transfer of Care Note    Patient: Jean-Paul Reeves    Procedure(s) Performed: Procedure(s) (LRB):  REPAIR, HERNIA, INGUINAL, WITHOUT HISTORY OF PRIOR REPAIR, AGE 5 YEARS OR OLDER (Right)    Patient location: PACU    Anesthesia Type: general    Transport from OR: Transported from OR on room air with adequate spontaneous ventilation    Post pain: adequate analgesia    Post assessment: no apparent anesthetic complications and tolerated procedure well    Post vital signs: stable    Level of consciousness: awake, alert and oriented    Nausea/Vomiting: no nausea/vomiting    Complications: none    Transfer of care protocol was followed      Last vitals:   Visit Vitals  /76   Pulse 63   Temp 36.6 °C (97.8 °F) (Temporal)   Resp 16   Ht 6' 2" (1.88 m)   Wt 91.6 kg (201 lb 15.1 oz)   SpO2 99%   BMI 25.93 kg/m²     "

## 2019-06-25 NOTE — OP NOTE
Operative Note       Surgery Date: 6/25/2019     Surgeon(s) and Role:     * Amarjit Cardenas MD - Primary    Pre-op Diagnosis:  Non-recurrent inguinal hernia of right side without obstruction or gangrene [K40.90]    Post-op Diagnosis: Post-Op Diagnosis Codes:     * Non-recurrent inguinal hernia of right side without obstruction or gangrene [K40.90]    Procedure(s) (LRB):  REPAIR, HERNIA, INGUINAL, WITHOUT HISTORY OF PRIOR REPAIR, AGE 5 YEARS OR OLDER (Right)    Anesthesia: General    Procedure in Detail/Findings:    Findings:  Us in small to moderate indirect right inguinal hernia    The patient was brought into the operative room placed on the operative table in supine position.  A laryngeal mask anesthesia was inducedAntibiotics were given intra operatively before the incision, and sequential compression devices were placed on both legs for DVT prophylaxis  the right inguinal area was clipped, prepped and draped in the standard fashion.    A time-out was performed.      An right inguinal incision was made between the anterior superior iliac spine and the pubic tubercle.  The incision was deepened through Annies and Campers fascia with electrocautery until the aponeurosis of the external oblique was encountered.  A bridging vein was controlled using electrocautery This was cleaned and the external ring was exposed.  Hemostasis was achieved in the wound.  An incision was made in the midportion of the external oblique aponeurosis in the direction of its fibers.  The ilioinguinal nerve was identified and protected throughout the dissection.  Flaps of the external oblique were developed cephalad and inferiorly.    The cord was identified.  It was gently dissected free at the pubic tubercle and encircled with a Penrose drain.  The sac was identified and carefully dissected free of the cord down to the level of the internal ring.  The vas deferens and testicular vessels were identified and protected from injury.  The  sac was reduced.  A finger was passed into the peritoneal cavity and the floor of the inguinal canal assessed and found to be strong.  The femoral canal was palpated and no hernia identified.  The sac was allowed to retract into the abdomen.     A plug of ultrapro mesh was placed in the indirect hernia and secured around the cord.  A polypropylene mesh was cut to the appropriate size with a lateral opening for the spermatic cord.  Beginning at the pubic tubercle, the mesh was sutured to the inguinal ligament inferiorly with running 0 vrycril suture, and the conjoined tendon superiorly with interrupted 0 vicryl sutures.  Care was taken to assure that the mesh was placed in a tension-free fashion and that no neurovascular structures were caught in the repair.  Laterally, the tails of the mesh were crossed and the internal ring recreated, allowing for passage of the surgeons fifth fingertip.    Hemostasis was again checked.  The Penrose  drain was removed.  The external oblique aponeurosis was closed with a running suture of 0 Vicryl, taking care not to catch the ilioinguinal nerve in the suture line.  Annies fascia was closed with interrupted 3-0 Vicryl.  The skin was closed with a running subcuticular 4-0 Monocryl suture.  The testis was gently pulled down into its anatomic position in the scrotum.  The patient tolerated the procedure well and was taken to the postanesthesia care unit in stable condition.     Estimated Blood Loss: 10 mL  IV fluids 500 mL  Marcaine 0.25% 30 mL             Specimens (From admission, onward)    None        Implants:   Implant Name Type Inv. Item Serial No.  Lot No. LRB No. Used   PLUG MEDIUM - SN/A  PLUG MEDIUM N/A [employer] [id number] Right 1              Disposition: PACU - hemodynamically stable.           Condition: Stable    Attestation:  I performed the procedure.           Discharge Note    Admit Date: 6/25/2019    Attending Physician: Amarjit Cardenas MD      Discharge Physician: Amarjit Cardenas MD    Final Diagnosis: Post-Op Diagnosis Codes:     * Non-recurrent inguinal hernia of right side without obstruction or gangrene [K40.90]    Disposition: Home or Self Care    Patient Instructions:   Current Discharge Medication List      START taking these medications    Details   HYDROcodone-acetaminophen (NORCO) 5-325 mg per tablet Take 1 tablet by mouth every 6 (six) hours as needed for Pain.  Qty: 20 tablet, Refills: 0      nozaseptin (NOZIN) nasal  1 each by Each Nare route 2 (two) times daily. for 7 days  Qty: 1 applicator, Refills: 0         CONTINUE these medications which have NOT CHANGED    Details   albuterol sulfate (PROAIR RESPICLICK) 90 mcg/actuation AePB Inhale 180 mcg into the lungs every 4 (four) hours as needed (cough or wheezing). Rescue  Qty: 1 each, Refills: 11    Associated Diagnoses: Mild persistent asthma without complication; Cough variant asthma      fluticasone propionate (FLONASE) 50 mcg/actuation nasal spray 2 sprays (100 mcg total) by Each Nare route once daily.  Qty: 16 g, Refills: 11    Associated Diagnoses: Non-seasonal allergic rhinitis due to other allergic trigger; Post-nasal drip      montelukast (SINGULAIR) 10 mg tablet Take 1 tablet (10 mg total) by mouth every evening.  Qty: 90 tablet, Refills: 3    Associated Diagnoses: Cough variant asthma; Non-seasonal allergic rhinitis due to other allergic trigger; Mild persistent asthma without complication      zolpidem (AMBIEN) 10 mg Tab Take 10 mg by mouth nightly as needed.      XARELTO 20 mg Tab TAKE 1 TABLET BY MOUTH EVERY DAY  Qty: 30 tablet, Refills: 5    Associated Diagnoses: Acute deep vein thrombosis (DVT) of proximal vein of left lower extremity             Discharge Procedure Orders (must include Diet, Follow-up, Activity)   Discharge Procedure Orders (must include Diet, Follow-up, Activity)   Diet general     Call MD for:  temperature >100.4     Call MD for:  persistent  nausea and vomiting     Call MD for:  severe uncontrolled pain     Call MD for:  difficulty breathing, headache or visual disturbances     Call MD for:  redness, tenderness, or signs of infection (pain, swelling, redness, odor or green/yellow discharge around incision site)     Remove dressing in 72 hours   Order Comments: May shower with the clear plastic dressing in place and once it has been removed        Discharge Date: No discharge date for patient encounter.

## 2019-06-25 NOTE — ANESTHESIA POSTPROCEDURE EVALUATION
Anesthesia Post Evaluation    Patient: Jean-Paul Reeves    Procedure(s) Performed: Procedure(s) (LRB):  REPAIR, HERNIA, INGUINAL, WITHOUT HISTORY OF PRIOR REPAIR, AGE 5 YEARS OR OLDER (Right)    Final Anesthesia Type: general  Patient location during evaluation: PACU  Patient participation: Yes- Able to Participate  Level of consciousness: awake and alert  Post-procedure vital signs: reviewed and stable  Pain management: adequate  Airway patency: patent  PONV status at discharge: No PONV  Anesthetic complications: no      Cardiovascular status: blood pressure returned to baseline and hemodynamically stable  Respiratory status: unassisted, spontaneous ventilation and room air  Hydration status: euvolemic  Follow-up not needed.          Vitals Value Taken Time   /80 6/25/2019  3:25 PM   Temp 36.4 °C (97.5 °F) 6/25/2019  2:47 PM   Pulse 66 6/25/2019  3:28 PM   Resp 29 6/25/2019  3:28 PM   SpO2 100 % 6/25/2019  3:28 PM   Vitals shown include unvalidated device data.      No case tracking events are documented in the log.      Pain/Greta Score: Pain Rating Prior to Med Admin: 6 (6/25/2019  3:25 PM)  Greta Score: 9 (6/25/2019  3:15 PM)

## 2019-06-25 NOTE — PLAN OF CARE
Pt resting on stretcher, pt reporting pain level tolerable. VSS. Will cont to monitor. See flow sheet for detailed assessment.

## 2019-06-25 NOTE — ANESTHESIA PREPROCEDURE EVALUATION
06/25/2019  Jean-Paul Reeves is a 54 y.o., male.    Anesthesia Evaluation    I have reviewed the Patient Summary Reports.    I have reviewed the Nursing Notes.   I have reviewed the Medications.     Review of Systems  Anesthesia Hx:  No problems with previous Anesthesia  Denies Family Hx of Anesthesia complications.   Denies Personal Hx of Anesthesia complications.   Cardiovascular:   Exercise tolerance: good  Functional Capacity good / => 4 METS      Patient Active Problem List   Diagnosis    History of pulmonary embolism    Primary hypercoagulable state    Internal derangement of knee joint    Chondromalacia of left knee    PSVT (paroxysmal supraventricular tachycardia)    History of DVT (deep vein thrombosis)    Post-phlebitic syndrome    Mild persistent asthma without complication    Heterozygous for prothrombin [id number] mutation         Physical Exam   Airway/Jaw/Neck:  Airway Findings: Mouth Opening: Normal Tongue: Normal  General Airway Assessment: Adult, Good  TM Distance: Normal, at least 6 cm       Chest/Lungs:  Chest/Lungs Findings: Normal Respiratory Rate         Mental Status:  Mental Status Findings:  Cooperative, Alert and Oriented         Anesthesia Plan  Type of Anesthesia, risks & benefits discussed:  Anesthesia Type:  general  Patient's Preference: General  Intra-op Monitoring Plan: standard ASA monitors  Intra-op Monitoring Plan Comments:   Post Op Pain Control Plan: per primary service following discharge from PACU  Post Op Pain Control Plan Comments: Per primary service  Induction:   IV  Beta Blocker:  Patient is not currently on a Beta-Blocker (No further documentation required).       Informed Consent: Patient understands risks and agrees with Anesthesia plan.  Questions answered. Anesthesia consent signed with patient.  ASA Score: 3     Day of Surgery Review of History &  Physical:    H&P update referred to the surgeon.         Ready For Surgery From Anesthesia Perspective.

## 2019-06-25 NOTE — DISCHARGE SUMMARY
[facility]  Discharge Note  Short Stay    Admit Date: 6/25/2019    Discharge Date and Time: 06/25/2019       Attending Physician: Amarjit Cardenas MD     Discharge Provider: Amarjit Cardenas    Diagnoses:  Active Hospital Problems    Diagnosis  POA    *Heterozygous for prothrombin [id number] mutation [D68.52]  Yes      Resolved Hospital Problems   No resolved problems to display.       Discharged Condition: stable    Hospital Course: Patient was admitted for an outpatient procedure and tolerated the procedure well with no complications.    Open repair of an indirect right inguinal hernia with a plug and patch technique    Final Diagnoses: Same as principal problem.    Disposition: Home or Self Care    Follow up/Patient Instructions:    Medications:  Reconciled Home Medications:      Medication List      START taking these medications    HYDROcodone-acetaminophen 5-325 mg per tablet  Commonly known as:  NORCO  Take 1 tablet by mouth every 6 (six) hours as needed for Pain.     nozaseptin nasal   Commonly known as:  NOZIN  1 each by Each Nare route 2 (two) times daily. for 7 days        CONTINUE taking these medications    albuterol sulfate 90 mcg/actuation Aepb  Commonly known as:  PROAIR RESPICLICK  Inhale 180 mcg into the lungs every 4 (four) hours as needed (cough or wheezing). Rescue     AMBIEN 10 mg Tab  Generic drug:  zolpidem  Take 10 mg by mouth nightly as needed.     fluticasone propionate 50 mcg/actuation nasal spray  Commonly known as:  FLONASE  2 sprays (100 mcg total) by Each Nare route once daily.     montelukast 10 mg tablet  Commonly known as:  SINGULAIR  Take 1 tablet (10 mg total) by mouth every evening.     XARELTO 20 mg Tab  Generic drug:  rivaroxaban  TAKE 1 TABLET BY MOUTH EVERY DAY          Discharge Procedure Orders   Diet general     Call MD for:  temperature >100.4     Call MD for:  persistent nausea and vomiting     Call MD for:  severe uncontrolled pain     Call MD for:   difficulty breathing, headache or visual disturbances     Call MD for:  redness, tenderness, or signs of infection (pain, swelling, redness, odor or green/yellow discharge around incision site)     Remove dressing in 72 hours   Order Comments: May shower with the clear plastic dressing in place and once it has been removed     Follow-up Information     Amarjit Cardenas MD.    Specialty:  General Surgery  Why:  post op appt, or as scheduled  Contact information:  [id number]  [place] [address]  [phone]                   Discharge Procedure Orders (must include Diet, Follow-up, Activity):   Discharge Procedure Orders (must include Diet, Follow-up, Activity)   Diet general     Call MD for:  temperature >100.4     Call MD for:  persistent nausea and vomiting     Call MD for:  severe uncontrolled pain     Call MD for:  difficulty breathing, headache or visual disturbances     Call MD for:  redness, tenderness, or signs of infection (pain, swelling, redness, odor or green/yellow discharge around incision site)     Remove dressing in 72 hours   Order Comments: May shower with the clear plastic dressing in place and once it has been removed

## 2019-06-25 NOTE — INTERVAL H&P NOTE
The patient has been examined and the H&P has been reviewed:    I concur with the findings and no changes have occurred since H&P was written.    Anesthesia/Surgery risks, benefits and alternative options discussed and understood by patient/family.          Active Hospital Problems    Diagnosis  POA    Heterozygous for prothrombin [id number] mutation [D68.52]  Yes      Resolved Hospital Problems   No resolved problems to display.

## 2019-06-25 NOTE — DISCHARGE INSTRUCTIONS
Please call for any fever, increase in pain, nausea or vomiting or redness or drainage from incision(s).    No lifting more than 30 pounds for 2 weeks    May shower with the clear plastic dressing in place and once it has been removed  Remove steri strips as they begin to fall off    If you become constipated from the pain medication you can use over the counter laxatives,  Miralax or Glycolax, or Magnesium Citrate for severe constipation.      You may resume your Xarelto tomorrow Wednesday June 26

## 2019-06-28 ENCOUNTER — TELEPHONE (OUTPATIENT)
Dept: SURGERY | Facility: CLINIC | Age: 54
End: 2019-06-28

## 2019-06-28 NOTE — TELEPHONE ENCOUNTER
Spoke to patient, he is aware that his [facility] paperwork was faxed to his employer, patient voiced understanding.

## 2019-07-10 ENCOUNTER — TELEPHONE (OUTPATIENT)
Dept: SURGERY | Facility: CLINIC | Age: 54
End: 2019-07-10

## 2019-07-10 NOTE — TELEPHONE ENCOUNTER
Spoke to patient, he stated that he would like to know if he could get another week off from work, he was advised that Dr. Cardenas is out on vacation and that per his [facility] paperwork Dr. Cardenas released him to go back to work on 07/15/2019. The patient was also advised that I cannot authorize an extra week off that Has to be authorized by Dr. Cardenas, patient voiced understanding.

## 2019-07-29 ENCOUNTER — OFFICE VISIT (OUTPATIENT)
Dept: SURGERY | Facility: CLINIC | Age: 54
End: 2019-07-29
Payer: COMMERCIAL

## 2019-07-29 VITALS
TEMPERATURE: 98 F | DIASTOLIC BLOOD PRESSURE: 85 MMHG | HEART RATE: 76 BPM | WEIGHT: 201 LBS | BODY MASS INDEX: 25.81 KG/M2 | SYSTOLIC BLOOD PRESSURE: 136 MMHG

## 2019-07-29 DIAGNOSIS — K40.90 NON-RECURRENT INGUINAL HERNIA OF RIGHT SIDE WITHOUT OBSTRUCTION OR GANGRENE: Primary | ICD-10-CM

## 2019-07-29 PROCEDURE — 99024 PR POST-OP FOLLOW-UP VISIT: ICD-10-PCS | Mod: S$GLB,,, | Performed by: SURGERY

## 2019-07-29 PROCEDURE — 99999 PR PBB SHADOW E&M-EST. PATIENT-LVL III: ICD-10-PCS | Mod: PBBFAC,,, | Performed by: SURGERY

## 2019-07-29 PROCEDURE — 99999 PR PBB SHADOW E&M-EST. PATIENT-LVL III: CPT | Mod: PBBFAC,,, | Performed by: SURGERY

## 2019-07-29 PROCEDURE — 99024 POSTOP FOLLOW-UP VISIT: CPT | Mod: S$GLB,,, | Performed by: SURGERY

## 2019-07-29 NOTE — PATIENT INSTRUCTIONS
There are no limits on your activity.  The small amount of soreness you feel should resolve over time
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
## 2019-12-04 DIAGNOSIS — I82.4Y2 ACUTE DEEP VEIN THROMBOSIS (DVT) OF PROXIMAL VEIN OF LEFT LOWER EXTREMITY: ICD-10-CM

## 2019-12-04 NOTE — TELEPHONE ENCOUNTER
----- Message from Abdelrahman Lechuga MD sent at 12/4/2019  1:44 PM CST -----  Prescription for Xarelto was refilled but will need to see someone since Dr. Schaeffer is no longer in the practice for follow-up he could be seen by nurse practitioner on a yearly basis

## 2019-12-05 ENCOUNTER — OFFICE VISIT (OUTPATIENT)
Dept: HEMATOLOGY/ONCOLOGY | Facility: CLINIC | Age: 54
End: 2019-12-05
Payer: COMMERCIAL

## 2019-12-05 ENCOUNTER — LAB VISIT (OUTPATIENT)
Dept: LAB | Facility: HOSPITAL | Age: 54
End: 2019-12-05
Attending: NURSE PRACTITIONER
Payer: COMMERCIAL

## 2019-12-05 VITALS
TEMPERATURE: 98 F | SYSTOLIC BLOOD PRESSURE: 124 MMHG | WEIGHT: 215.38 LBS | HEIGHT: 74 IN | DIASTOLIC BLOOD PRESSURE: 75 MMHG | OXYGEN SATURATION: 95 % | RESPIRATION RATE: 18 BRPM | BODY MASS INDEX: 27.64 KG/M2 | HEART RATE: 75 BPM

## 2019-12-05 DIAGNOSIS — Z86.711 HISTORY OF PULMONARY EMBOLISM: ICD-10-CM

## 2019-12-05 DIAGNOSIS — D68.59 PRIMARY HYPERCOAGULABLE STATE: ICD-10-CM

## 2019-12-05 DIAGNOSIS — I82.4Y2 ACUTE DEEP VEIN THROMBOSIS (DVT) OF PROXIMAL VEIN OF LEFT LOWER EXTREMITY: ICD-10-CM

## 2019-12-05 DIAGNOSIS — Z86.718 HISTORY OF DVT (DEEP VEIN THROMBOSIS): ICD-10-CM

## 2019-12-05 DIAGNOSIS — Z86.711 HISTORY OF PULMONARY EMBOLISM: Primary | ICD-10-CM

## 2019-12-05 DIAGNOSIS — D68.52 HETEROZYGOUS FOR PROTHROMBIN G20210A MUTATION: Primary | ICD-10-CM

## 2019-12-05 LAB
ALBUMIN SERPL BCP-MCNC: 4.1 G/DL (ref 3.5–5.2)
ALP SERPL-CCNC: 68 U/L (ref 55–135)
ALT SERPL W/O P-5'-P-CCNC: 15 U/L (ref 10–44)
ANION GAP SERPL CALC-SCNC: 9 MMOL/L (ref 8–16)
AST SERPL-CCNC: 19 U/L (ref 10–40)
BASOPHILS # BLD AUTO: 0.04 K/UL (ref 0–0.2)
BASOPHILS NFR BLD: 0.7 % (ref 0–1.9)
BILIRUB SERPL-MCNC: 0.4 MG/DL (ref 0.1–1)
BUN SERPL-MCNC: 14 MG/DL (ref 6–20)
CALCIUM SERPL-MCNC: 9.4 MG/DL (ref 8.7–10.5)
CHLORIDE SERPL-SCNC: 104 MMOL/L (ref 95–110)
CO2 SERPL-SCNC: 26 MMOL/L (ref 23–29)
CREAT SERPL-MCNC: 1.2 MG/DL (ref 0.5–1.4)
DIFFERENTIAL METHOD: ABNORMAL
EOSINOPHIL # BLD AUTO: 0.3 K/UL (ref 0–0.5)
EOSINOPHIL NFR BLD: 4.3 % (ref 0–8)
ERYTHROCYTE [DISTWIDTH] IN BLOOD BY AUTOMATED COUNT: 14.4 % (ref 11.5–14.5)
EST. GFR  (AFRICAN AMERICAN): >60 ML/MIN/1.73 M^2
EST. GFR  (NON AFRICAN AMERICAN): >60 ML/MIN/1.73 M^2
GLUCOSE SERPL-MCNC: 94 MG/DL (ref 70–110)
HCT VFR BLD AUTO: 45.8 % (ref 40–54)
HGB BLD-MCNC: 14.4 G/DL (ref 14–18)
IMM GRANULOCYTES # BLD AUTO: 0.01 K/UL (ref 0–0.04)
IMM GRANULOCYTES NFR BLD AUTO: 0.2 % (ref 0–0.5)
LYMPHOCYTES # BLD AUTO: 3 K/UL (ref 1–4.8)
LYMPHOCYTES NFR BLD: 51.5 % (ref 18–48)
MCH RBC QN AUTO: 27.5 PG (ref 27–31)
MCHC RBC AUTO-ENTMCNC: 31.4 G/DL (ref 32–36)
MCV RBC AUTO: 87 FL (ref 82–98)
MONOCYTES # BLD AUTO: 0.5 K/UL (ref 0.3–1)
MONOCYTES NFR BLD: 7.7 % (ref 4–15)
NEUTROPHILS # BLD AUTO: 2.1 K/UL (ref 1.8–7.7)
NEUTROPHILS NFR BLD: 35.8 % (ref 38–73)
NRBC BLD-RTO: 0 /100 WBC
PLATELET # BLD AUTO: 230 K/UL (ref 150–350)
PMV BLD AUTO: 10.3 FL (ref 9.2–12.9)
POTASSIUM SERPL-SCNC: 4.3 MMOL/L (ref 3.5–5.1)
PROT SERPL-MCNC: 7.6 G/DL (ref 6–8.4)
RBC # BLD AUTO: 5.24 M/UL (ref 4.6–6.2)
SODIUM SERPL-SCNC: 139 MMOL/L (ref 136–145)
WBC # BLD AUTO: 5.82 K/UL (ref 3.9–12.7)

## 2019-12-05 PROCEDURE — 99214 PR OFFICE/OUTPT VISIT, EST, LEVL IV, 30-39 MIN: ICD-10-PCS | Mod: S$GLB,,, | Performed by: NURSE PRACTITIONER

## 2019-12-05 PROCEDURE — 99999 PR PBB SHADOW E&M-EST. PATIENT-LVL III: ICD-10-PCS | Mod: PBBFAC,,, | Performed by: NURSE PRACTITIONER

## 2019-12-05 PROCEDURE — 80053 COMPREHEN METABOLIC PANEL: CPT

## 2019-12-05 PROCEDURE — 99999 PR PBB SHADOW E&M-EST. PATIENT-LVL III: CPT | Mod: PBBFAC,,, | Performed by: NURSE PRACTITIONER

## 2019-12-05 PROCEDURE — 3008F PR BODY MASS INDEX (BMI) DOCUMENTED: ICD-10-PCS | Mod: CPTII,S$GLB,, | Performed by: NURSE PRACTITIONER

## 2019-12-05 PROCEDURE — 85025 COMPLETE CBC W/AUTO DIFF WBC: CPT

## 2019-12-05 PROCEDURE — 3008F BODY MASS INDEX DOCD: CPT | Mod: CPTII,S$GLB,, | Performed by: NURSE PRACTITIONER

## 2019-12-05 PROCEDURE — 99214 OFFICE O/P EST MOD 30 MIN: CPT | Mod: S$GLB,,, | Performed by: NURSE PRACTITIONER

## 2019-12-05 PROCEDURE — 36415 COLL VENOUS BLD VENIPUNCTURE: CPT

## 2019-12-05 NOTE — PROGRESS NOTES
Subjective:      Patient ID: Jean-Paul Reeves is a 54 y.o. male.    Chief Complaint: Follow-up      HPI:  Reason for visit: Followup for recurrent left lower extremity DVT/History of PE/urgent care visit     HPI:   The patient is a 54-year-old  male who presents to the hematology oncology clinic today for follow up of his hypercoagulable state on life long anticoagulation. He has a history of recurrent left lower extremity DVT and history of pulmonary embolism. Had repeat US of left leg at last visit due to complaints of leg pain and swelling.  No acute clot found; however chronic clots were present.  He states compliance with once daily Xarelto 20 mg PO.      He denies any chest pain or shortness of breath/dyspnea with exertion.  He denies any fever, chills or night sweats.  He denies any melena, hematochezia, hematemesis, hemoptysis or hematuria.  He denies any bowel or urinary complaints.  I have reviewed all of the patient's interval medical history available in EPIC and have utilized this in my evaluation and management recommendations today.  He has been exercising regularly. He is uptodate with colon cancer.  He states he is unsure if he is up to date on prostate cancer screening.  All testing is done through his work.  He states he will ask if PSAs are being monitored.  The patient has been follow in the clinic by Dr. Schaeffer.  Today is the first time I am evaluating/assessing the patient.     PAST MEDICAL HISTORY:   1. Left lower extremity DVT (Sept 5, 2011)  2. Bilateral PE (Sept 5, 2011)  3. Paroxysmal SVT    SURGICAL HISTORY:   1. Appendectomy    FAMILY HISTORY: He reports that one of his sisters was diagnosed with a DVT at the age of 28. She has since been diagnosed with protein C deficiency and is currently on therapeutic anticoagulation. He reports that a maternal aunt was also diagnosed with protein C deficiency. His mother has leukemia and was diagnosed at the age of 61. He denies any  other immediate family members with cancer.    SOCIAL HISTORY: She does not smoke cigarettes. He drinks alcohol socially. He does not use any recreational drugs. He works as a  with [employer]. He is  and has 3 biological children and 2 stepchildren. He lives with his wife in [place].    ALLERGIES: [NKDA]    MEDICATIONS: [Medcard has been reviewed and/or reconciled.]      Social History     Socioeconomic History    Marital status:      Spouse name: Not on file    Number of children: Not on file    Years of education: Not on file    Highest education level: Not on file   Occupational History    Not on file   Social Needs    Financial resource strain: Not on file    Food insecurity:     Worry: Not on file     Inability: Not on file    Transportation needs:     Medical: Not on file     Non-medical: Not on file   Tobacco Use    Smoking status: Never Smoker    Smokeless tobacco: Never Used   Substance and Sexual Activity    Alcohol use: Yes     Comment: occasionally, stop alcohol as of today    Drug use: No    Sexual activity: Not on file   Lifestyle    Physical activity:     Days per week: Not on file     Minutes per session: Not on file    Stress: Not on file   Relationships    Social connections:     Talks on phone: Not on file     Gets together: Not on file     Attends Rastafari service: Not on file     Active member of club or organization: Not on file     Attends meetings of clubs or organizations: Not on file     Relationship status: Not on file   Other Topics Concern    Not on file   Social History Narrative    Not on file       Family History   Problem Relation Age of Onset    Cancer Mother        Past Surgical History:   Procedure Laterality Date    APPENDECTOMY      HERNIA REPAIR      Inguinal       Past Medical History:   Diagnosis Date    Anticoagulant long-term use     started 5-6 years ago Xarelto    Blood clot in vein      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   HENT: Negative for congestion, dental problem, drooling, ear discharge, ear pain, facial swelling, hearing loss, mouth sores, nosebleeds, postnasal drip, rhinorrhea, sinus pressure, sneezing, sore throat, tinnitus, trouble swallowing and voice change.    Eyes: Negative for photophobia, pain, discharge, redness, itching and visual disturbance.   Respiratory: Negative for apnea, cough, choking, chest tightness, shortness of breath, wheezing and stridor.    Cardiovascular: Negative for chest pain, palpitations and leg swelling.   Gastrointestinal: Negative for abdominal distention, abdominal pain, anal bleeding, blood in stool, constipation, diarrhea, nausea, rectal pain and vomiting.   Endocrine: Negative for cold intolerance, heat intolerance, polydipsia, polyphagia and polyuria.   Genitourinary: Negative for decreased urine volume, difficulty urinating, discharge, dysuria, enuresis, flank pain, frequency, genital sores, hematuria, penile pain, penile swelling, scrotal swelling, testicular pain and urgency.   Musculoskeletal: Negative for arthralgias, back pain, gait problem, joint swelling, myalgias, neck pain and neck stiffness.   Skin: Negative for color change, pallor, rash and wound.   Allergic/Immunologic: Negative for environmental allergies, food allergies and immunocompromised state.   Neurological: Negative for dizziness, tremors, seizures, syncope, facial asymmetry, speech difficulty, weakness, light-headedness, numbness and headaches.   Hematological: Negative for adenopathy. Does not bruise/bleed easily.   Psychiatric/Behavioral: Negative for agitation, behavioral problems, confusion, decreased concentration, dysphoric mood, hallucinations, self-injury, sleep  disturbance and suicidal ideas. The patient is not nervous/anxious and is not hyperactive.           Medication List with Changes/Refills   Current Medications    ALBUTEROL SULFATE (PROAIR RESPICLICK) 90 MCG/ACTUATION AEPB    Inhale 180 mcg into the lungs every 4 (four) hours as needed (cough or wheezing). Rescue    FLUTICASONE PROPIONATE (FLONASE) 50 MCG/ACTUATION NASAL SPRAY    2 sprays (100 mcg total) by Each Nare route once daily.    HYDROCODONE-ACETAMINOPHEN (NORCO) 5-325 MG PER TABLET    Take 1 tablet by mouth every 6 (six) hours as needed for Pain.    ZOLPIDEM (AMBIEN) 10 MG TAB    Take 10 mg by mouth nightly as needed.   Changed and/or Refilled Medications    Modified Medication Previous Medication    RIVAROXABAN (XARELTO) 20 MG TAB rivaroxaban (XARELTO) 20 mg Tab       Take 1 tablet (20 mg total) by mouth once daily.    Take 1 tablet (20 mg total) by mouth once daily.        Objective:     Vitals:    12/05/19 1521   BP: 124/75   Pulse: 75   Resp: 18   Temp: 97.6 °F (36.4 °C)       Physical Exam   Constitutional: He is oriented to person, place, and time. Vital signs are normal. He appears well-developed and well-nourished.  Non-toxic appearance. He does not have a sickly appearance. He does not appear ill. No distress.   HENT:   Head: Normocephalic and atraumatic.   Right Ear: External ear normal.   Left Ear: External ear normal.   Nose: Nose normal.   Eyes: Conjunctivae, EOM and lids are normal. Right eye exhibits no discharge. Left eye exhibits no discharge. No scleral icterus.   Cardiovascular: Normal rate, regular rhythm, S1 normal, S2 normal and normal heart sounds. Exam reveals no gallop and no friction rub.   No murmur heard.  Pulmonary/Chest: Effort normal and breath sounds normal. No accessory muscle usage or stridor. No apnea, no tachypnea and no bradypnea. No respiratory distress. He has no decreased breath sounds. He has no wheezes. He has no rales.   Abdominal: Soft. Normal appearance and bowel  sounds are normal. He exhibits no distension.   Musculoskeletal: Normal range of motion.   Neurological: He is alert and oriented to person, place, and time.   Skin: Skin is warm, dry and intact. No bruising, no lesion and no rash noted. He is not diaphoretic. No pallor.   Psychiatric: He has a normal mood and affect. His speech is normal and behavior is normal. Judgment and thought content normal. He is not actively hallucinating. Cognition and memory are normal. He is attentive.   Nursing note and vitals reviewed.      Assessment:     Problem List Items Addressed This Visit        Hematology    History of pulmonary embolism    Relevant Orders    CBC auto differential    Comprehensive metabolic panel    Primary hypercoagulable state    Relevant Orders    CBC auto differential    Comprehensive metabolic panel    History of DVT (deep vein thrombosis)    Relevant Orders    CBC auto differential    Comprehensive metabolic panel    Heterozygous for prothrombin [id number] mutation - Primary    Relevant Orders    CBC auto differential    Comprehensive metabolic panel      Other Visit Diagnoses     Acute deep vein thrombosis (DVT) of proximal vein of left lower extremity        Relevant Medications    rivaroxaban (XARELTO) 20 mg Tab    Other Relevant Orders    CBC auto differential    Comprehensive metabolic panel        Lab Results   Component Value Date    WBC 5.82 12/05/2019    HGB 14.4 12/05/2019    HCT 45.8 12/05/2019    MCV 87 12/05/2019     12/05/2019       BMP  Lab Results   Component Value Date     12/05/2019    K 4.3 12/05/2019     12/05/2019    CO2 26 12/05/2019    BUN 14 12/05/2019    CREATININE 1.2 12/05/2019    CALCIUM 9.4 12/05/2019    ANIONGAP 9 12/05/2019    ESTGFRAFRICA >60 12/05/2019    EGFRNONAA >60 12/05/2019     Lab Results   Component Value Date    ALT 15 12/05/2019    AST 19 12/05/2019    ALKPHOS 68 12/05/2019    BILITOT 0.4 12/05/2019       Plan:   Heterozygous for prothrombin  [id number] mutation  -     CBC auto differential; Future; Expected date: 12/05/2019  -     Comprehensive metabolic panel; Future; Expected date: 12/05/2019    Acute deep vein thrombosis (DVT) of proximal vein of left lower extremity  -     rivaroxaban (XARELTO) 20 mg Tab; Take 1 tablet (20 mg total) by mouth once daily.  Dispense: 90 tablet; Refill: 3  -     CBC auto differential; Future; Expected date: 12/05/2019  -     Comprehensive metabolic panel; Future; Expected date: 12/05/2019    History of DVT (deep vein thrombosis)  -     CBC auto differential; Future; Expected date: 12/05/2019  -     Comprehensive metabolic panel; Future; Expected date: 12/05/2019    History of pulmonary embolism  -     CBC auto differential; Future; Expected date: 12/05/2019  -     Comprehensive metabolic panel; Future; Expected date: 12/05/2019    Primary hypercoagulable state  -     CBC auto differential; Future; Expected date: 12/05/2019  -     Comprehensive metabolic panel; Future; Expected date: 12/05/2019    Will continue taking Xarelto 20 mg PO daily lifelong.  Will follow up in 1 year with repeat labs.  He knows to monitor for s/s of abnormal bleeding and to follow up if noted.      I will review assessment/plan with collaborating physician, Dr. Melony Lr.    Thank You,  HARJINDER Hope-C

## 2020-05-29 DIAGNOSIS — J30.89 NON-SEASONAL ALLERGIC RHINITIS DUE TO OTHER ALLERGIC TRIGGER: ICD-10-CM

## 2020-05-29 DIAGNOSIS — J45.30 MILD PERSISTENT ASTHMA WITHOUT COMPLICATION: ICD-10-CM

## 2020-05-29 DIAGNOSIS — J45.991 COUGH VARIANT ASTHMA: ICD-10-CM

## 2020-05-29 RX ORDER — MONTELUKAST SODIUM 10 MG/1
TABLET ORAL
Qty: 90 TABLET | Refills: 3 | Status: SHIPPED | OUTPATIENT
Start: 2020-05-29 | End: 2022-02-22

## 2020-12-22 ENCOUNTER — HOSPITAL ENCOUNTER (OUTPATIENT)
Dept: RADIOLOGY | Facility: HOSPITAL | Age: 55
Discharge: HOME | End: 2020-12-22
Attending: INTERNAL MEDICINE
Payer: COMMERCIAL

## 2020-12-22 ENCOUNTER — OFFICE VISIT (OUTPATIENT)
Dept: HEMATOLOGY/ONCOLOGY | Facility: CLINIC | Age: 55
End: 2020-12-22
Payer: COMMERCIAL

## 2020-12-22 VITALS
DIASTOLIC BLOOD PRESSURE: 83 MMHG | SYSTOLIC BLOOD PRESSURE: 130 MMHG | BODY MASS INDEX: 28.35 KG/M2 | TEMPERATURE: 98 F | OXYGEN SATURATION: 98 % | WEIGHT: 220.88 LBS | HEIGHT: 74 IN | HEART RATE: 74 BPM

## 2020-12-22 DIAGNOSIS — D68.59 PRIMARY HYPERCOAGULABLE STATE: ICD-10-CM

## 2020-12-22 DIAGNOSIS — D68.52 HETEROZYGOUS FOR PROTHROMBIN G20210A MUTATION: ICD-10-CM

## 2020-12-22 DIAGNOSIS — D70.9 NEUTROPENIA, UNSPECIFIED TYPE: ICD-10-CM

## 2020-12-22 DIAGNOSIS — Z86.711 HISTORY OF PULMONARY EMBOLISM: Primary | ICD-10-CM

## 2020-12-22 DIAGNOSIS — Z86.718 HISTORY OF DVT (DEEP VEIN THROMBOSIS): ICD-10-CM

## 2020-12-22 DIAGNOSIS — Z79.01 CHRONIC ANTICOAGULATION: ICD-10-CM

## 2020-12-22 DIAGNOSIS — M79.605 LEFT LEG PAIN: ICD-10-CM

## 2020-12-22 PROCEDURE — 1126F AMNT PAIN NOTED NONE PRSNT: CPT | Mod: S$GLB,,, | Performed by: INTERNAL MEDICINE

## 2020-12-22 PROCEDURE — 99999 PR PBB SHADOW E&M-EST. PATIENT-LVL IV: CPT | Mod: PBBFAC,,, | Performed by: INTERNAL MEDICINE

## 2020-12-22 PROCEDURE — 3008F PR BODY MASS INDEX (BMI) DOCUMENTED: ICD-10-PCS | Mod: CPTII,S$GLB,, | Performed by: INTERNAL MEDICINE

## 2020-12-22 PROCEDURE — 1126F PR PAIN SEVERITY QUANTIFIED, NO PAIN PRESENT: ICD-10-PCS | Mod: S$GLB,,, | Performed by: INTERNAL MEDICINE

## 2020-12-22 PROCEDURE — 93971 EXTREMITY STUDY: CPT | Mod: TC,LT

## 2020-12-22 PROCEDURE — 99214 PR OFFICE/OUTPT VISIT, EST, LEVL IV, 30-39 MIN: ICD-10-PCS | Mod: S$GLB,,, | Performed by: INTERNAL MEDICINE

## 2020-12-22 PROCEDURE — 3008F BODY MASS INDEX DOCD: CPT | Mod: CPTII,S$GLB,, | Performed by: INTERNAL MEDICINE

## 2020-12-22 PROCEDURE — 99999 PR PBB SHADOW E&M-EST. PATIENT-LVL IV: ICD-10-PCS | Mod: PBBFAC,,, | Performed by: INTERNAL MEDICINE

## 2020-12-22 PROCEDURE — 99214 OFFICE O/P EST MOD 30 MIN: CPT | Mod: S$GLB,,, | Performed by: INTERNAL MEDICINE

## 2020-12-22 NOTE — PROGRESS NOTES
Subjective:      DATE OF VISIT: 12/22/20     ?  Patient ID:?Jean-Paul Reeves is a 55 y.o. male.?? MR#: [id number]   ?   REFERRING PROVIDER: No referring provider defined for this encounter.     ? Primary Care Providers:  Yosvany Winn MD, MD (General)     CHIEF COMPLAINT: ?  Follow-up, history of DVT/PE/heterozygote prothrombin gene mutation??   ?   HPI    I had the pleasure meeting for the 1st time Mr. Reeves a 55-year-old followed in our clinic for heterozygote mutation prothrombin gene mutation and history of DVT/PE.  He had 1st thrombosis in September 2011 with acute left lower extremity DVT located in the posterior tibial, popliteal and femoral veins and bilateral pulmonary embolism.  He maintains chronic anticoagulation with Xarelto 20 mg daily tolerating well without missed doses.  He was last seen by nurse practitioner Misa Singleton 12/05/2019.  He is up-to-date on age-appropriate cancer screening he notes having primary care outside of [facility] and believes he is getting PSA screening however is unsure, last in our system 2014 within normal limits.  No  symptoms or bony pain.  Last colonoscopy 2012 recommended 10 year follow-up. No family history of malignancy.    For couple weeks he has had new tenderness deep within posterior aspect of thigh down to calf.  No associated edema.  No recent muscle strain/injury.  He denies associated edema.  No chest pain or shortness of breath.  He is taking rivaroxaban 20 mg daily without missed doses.     Review of Systems    ?   A comprehensive 14-point review of systems was reviewed with patient and was negative other than as specified above.   ?   PAST MEDICAL HISTORY:   Past Medical History:   Diagnosis Date    Anticoagulant long-term use     started 5-6 years ago Xarelto    Blood clot in vein     Heterozygous for prothrombin [id number] mutation     Pulmonary embolism     Tachycardia     ?     PAST SURGICAL HISTORY:   Past Surgical History:   Procedure Laterality  Date    APPENDECTOMY      HERNIA REPAIR      Inguinal      ?   ALLERGIES:   Allergies as of 12/22/2020    (No Known Allergies)      ?   MEDICATIONS:?   Outpatient Medications Marked as Taking for the 12/22/20 encounter (Office Visit) with Melony Lr MD   Medication Sig Dispense Refill    HYDROcodone-acetaminophen (NORCO) 5-325 mg per tablet Take 1 tablet by mouth every 6 (six) hours as needed for Pain. 20 tablet 0    montelukast (SINGULAIR) 10 mg tablet TAKE 1 TABLET(10 MG) BY MOUTH EVERY EVENING 90 tablet 3    rivaroxaban (XARELTO) 20 mg Tab Take 1 tablet (20 mg total) by mouth once daily. 90 tablet 3    zolpidem (AMBIEN) 10 mg Tab Take 10 mg by mouth nightly as needed.       Current Facility-Administered Medications for the 12/22/20 encounter (Office Visit) with Melony Lr MD   Medication Dose Route Frequency Provider Last Rate Last Dose    lidocaine (PF) 10 mg/ml (1%) injection 10 mg  1 mL Intradermal Once Amarjit Cardenas MD          ?   SOCIAL HISTORY:?   Social History     Tobacco Use    Smoking status: Never Smoker    Smokeless tobacco: Never Used   Substance Use Topics    Alcohol use: Yes     Comment: occasionally, stop alcohol as of today      ?      ?   FAMILY HISTORY:   family history includes Cancer in his mother.   ?        Objective:      Physical Exam      ?   Vitals:    12/22/20 1341   BP: 130/83   Pulse: 74   Temp: 97.9 °F (36.6 °C)      ECOG:?0   General appearance: Generally well appearing, in no acute distress.   Head, eyes, ears, nose, and throat: moist mucous membranes.   Respiratory:  Normal work of breathing  Abdomen: nontender, nondistended.   Extremities: Warm, without edema.  Mildly prominent varicose veins left lower extremity, minimal tenderness with palpation to calf without appreciated cord.  Neurologic: Alert and oriented. Grossly normal strength, coordination, and gait.   Skin: No rashes, ecchymoses or petechial lesion.   Psychiatric:  Normal mood and  affect.    ?   Laboratory:  ?   Lab Visit on 12/22/2020   Component Date Value Ref Range Status    WBC 12/22/2020 4.93  3.90 - 12.70 K/uL Final    RBC 12/22/2020 5.17  4.60 - 6.20 M/uL Final    Hemoglobin 12/22/2020 14.3  14.0 - 18.0 g/dL Final    Hematocrit 12/22/2020 45.3  40.0 - 54.0 % Final    MCV 12/22/2020 88  82 - 98 fL Final    MCH 12/22/2020 27.7  27.0 - 31.0 pg Final    MCHC 12/22/2020 31.6* 32.0 - 36.0 g/dL Final    RDW 12/22/2020 13.7  11.5 - 14.5 % Final    Platelets 12/22/2020 227  150 - 350 K/uL Final    MPV 12/22/2020 10.8  9.2 - 12.9 fL Final    Immature Granulocytes 12/22/2020 0.2  0.0 - 0.5 % Final    Gran # (ANC) 12/22/2020 1.4* 1.8 - 7.7 K/uL Final    Immature Grans (Abs) 12/22/2020 0.01  0.00 - 0.04 K/uL Final    Lymph # 12/22/2020 3.0  1.0 - 4.8 K/uL Final    Mono # 12/22/2020 0.3  0.3 - 1.0 K/uL Final    Eos # 12/22/2020 0.2  0.0 - 0.5 K/uL Final    Baso # 12/22/2020 0.04  0.00 - 0.20 K/uL Final    nRBC 12/22/2020 0  0 /100 WBC Final    Gran % 12/22/2020 27.6* 38.0 - 73.0 % Final    Lymph % 12/22/2020 60.6* 18.0 - 48.0 % Final    Mono % 12/22/2020 6.9  4.0 - 15.0 % Final    Eosinophil % 12/22/2020 3.9  0.0 - 8.0 % Final    Basophil % 12/22/2020 0.8  0.0 - 1.9 % Final    Differential Method 12/22/2020 Automated   Final    Sodium 12/22/2020 142  136 - 145 mmol/L Final    Potassium 12/22/2020 4.0  3.5 - 5.1 mmol/L Final    Chloride 12/22/2020 106  95 - 110 mmol/L Final    CO2 12/22/2020 30* 23 - 29 mmol/L Final    Glucose 12/22/2020 112* 70 - 110 mg/dL Final    BUN 12/22/2020 14  6 - 20 mg/dL Final    Creatinine 12/22/2020 1.1  0.5 - 1.4 mg/dL Final    Calcium 12/22/2020 9.1  8.7 - 10.5 mg/dL Final    Total Protein 12/22/2020 7.4  6.0 - 8.4 g/dL Final    Albumin 12/22/2020 4.0  3.5 - 5.2 g/dL Final    Total Bilirubin 12/22/2020 0.5  0.1 - 1.0 mg/dL Final    Alkaline Phosphatase 12/22/2020 72  55 - 135 U/L Final    AST 12/22/2020 18  10 - 40 U/L Final    ALT  12/22/2020 15  10 - 44 U/L Final    Anion Gap 12/22/2020 6* 8 - 16 mmol/L Final    eGFR if African American 12/22/2020 >60  >60 mL/min/1.73 m^2 Final    eGFR if non African American 12/22/2020 >60  >60 mL/min/1.73 m^2 Final          ?   Assessment/Plan:   History of pulmonary embolism    Heterozygous for prothrombin [id number] mutation    History of DVT (deep vein thrombosis)    Primary hypercoagulable state  -     PSA, Screening; Future; Expected date: 12/22/2020    Chronic anticoagulation    Left leg pain  -     US Lower Extremity Veins Left; Future; Expected date: 12/22/2020    Neutropenia, unspecified type  -     Pathologist Interpretation Differential; Future; Expected date: 12/22/2020       1. History of pulmonary embolism    2. Heterozygous for prothrombin [id number] mutation    3. History of DVT (deep vein thrombosis)    4. Primary hypercoagulable state    5. Chronic anticoagulation    6. Left leg pain    7. Neutropenia, unspecified type          Plan:     # history of DVT/PE 2011, heterozygote for prothrombin gene mutation:  On chronic anticoagulation with rivaroxaban 20 mg daily taking as prescribed without missed doses.  Recent development of left lower extremity tenderness; discussed would be unusual for progressive thrombosis with therapeutic anticoagulation but in rare instances can have recurrent DVT on anticoagulation and given symptoms and prior history and hypercoagulable state reasonable to check.  I have ordered left lower extremity Doppler scan to rule out thrombosis.   Addendum:  Negative for DVT    # neutropenia:  Absolute neutropenia with mild lymphocyte predominance appears to have been relatively stable over at least 8 years.  Will order peripheral blood smear review.  No history of recurrent infection.      Follow-Up:   Patient Instructions   Please add on peripheral blood smear and PSA to today's cbc  Left leg doppler venous US, will write with results  RV in 1 year with cbc prior

## 2020-12-22 NOTE — PATIENT INSTRUCTIONS
Please add on peripheral blood smear and PSA to today's cbc  Left leg doppler venous US, will write with results  RV in 1 year with cbc prior

## 2021-07-21 ENCOUNTER — TELEPHONE (OUTPATIENT)
Dept: ORTHOPEDICS | Facility: CLINIC | Age: 56
End: 2021-07-21

## 2021-07-21 DIAGNOSIS — M25.562 PAIN IN BOTH KNEES, UNSPECIFIED CHRONICITY: Primary | ICD-10-CM

## 2021-07-21 DIAGNOSIS — M25.561 PAIN IN BOTH KNEES, UNSPECIFIED CHRONICITY: Primary | ICD-10-CM

## 2021-07-22 ENCOUNTER — OFFICE VISIT (OUTPATIENT)
Dept: ORTHOPEDICS | Facility: CLINIC | Age: 56
End: 2021-07-22
Payer: COMMERCIAL

## 2021-07-22 ENCOUNTER — HOSPITAL ENCOUNTER (OUTPATIENT)
Dept: RADIOLOGY | Facility: HOSPITAL | Age: 56
Discharge: HOME | End: 2021-07-22
Attending: PHYSICIAN ASSISTANT
Payer: COMMERCIAL

## 2021-07-22 VITALS
WEIGHT: 220.88 LBS | HEIGHT: 74 IN | BODY MASS INDEX: 28.35 KG/M2 | HEART RATE: 73 BPM | SYSTOLIC BLOOD PRESSURE: 119 MMHG | DIASTOLIC BLOOD PRESSURE: 77 MMHG

## 2021-07-22 DIAGNOSIS — M17.11 PATELLOFEMORAL ARTHRITIS OF RIGHT KNEE: Primary | ICD-10-CM

## 2021-07-22 DIAGNOSIS — M25.561 ANTERIOR KNEE PAIN, RIGHT: ICD-10-CM

## 2021-07-22 DIAGNOSIS — M25.522 LEFT ELBOW PAIN: ICD-10-CM

## 2021-07-22 DIAGNOSIS — M77.12 LEFT LATERAL EPICONDYLITIS: ICD-10-CM

## 2021-07-22 DIAGNOSIS — M75.42 IMPINGEMENT SYNDROME OF LEFT SHOULDER: ICD-10-CM

## 2021-07-22 DIAGNOSIS — M25.512 LEFT SHOULDER PAIN, UNSPECIFIED CHRONICITY: Primary | ICD-10-CM

## 2021-07-22 DIAGNOSIS — R20.2 NUMBNESS AND TINGLING IN LEFT HAND: ICD-10-CM

## 2021-07-22 DIAGNOSIS — M25.562 PAIN IN BOTH KNEES, UNSPECIFIED CHRONICITY: ICD-10-CM

## 2021-07-22 DIAGNOSIS — M25.561 PAIN IN BOTH KNEES, UNSPECIFIED CHRONICITY: ICD-10-CM

## 2021-07-22 DIAGNOSIS — R20.0 NUMBNESS AND TINGLING IN LEFT HAND: ICD-10-CM

## 2021-07-22 PROCEDURE — 73564 X-RAY EXAM KNEE 4 OR MORE: CPT | Mod: TC,50

## 2021-07-22 PROCEDURE — 3008F PR BODY MASS INDEX (BMI) DOCUMENTED: ICD-10-PCS | Mod: CPTII,S$GLB,, | Performed by: PHYSICIAN ASSISTANT

## 2021-07-22 PROCEDURE — 99203 PR OFFICE/OUTPT VISIT, NEW, LEVL III, 30-44 MIN: ICD-10-PCS | Mod: S$GLB,,, | Performed by: PHYSICIAN ASSISTANT

## 2021-07-22 PROCEDURE — 1125F AMNT PAIN NOTED PAIN PRSNT: CPT | Mod: CPTII,S$GLB,, | Performed by: PHYSICIAN ASSISTANT

## 2021-07-22 PROCEDURE — 3008F BODY MASS INDEX DOCD: CPT | Mod: CPTII,S$GLB,, | Performed by: PHYSICIAN ASSISTANT

## 2021-07-22 PROCEDURE — 99203 OFFICE O/P NEW LOW 30 MIN: CPT | Mod: S$GLB,,, | Performed by: PHYSICIAN ASSISTANT

## 2021-07-22 PROCEDURE — 73564 XR KNEE ORTHO BILAT WITH FLEXION: ICD-10-PCS | Mod: 26,,, | Performed by: RADIOLOGY

## 2021-07-22 PROCEDURE — 99999 PR PBB SHADOW E&M-EST. PATIENT-LVL III: CPT | Mod: PBBFAC,,, | Performed by: PHYSICIAN ASSISTANT

## 2021-07-22 PROCEDURE — 99999 PR PBB SHADOW E&M-EST. PATIENT-LVL III: ICD-10-PCS | Mod: PBBFAC,,, | Performed by: PHYSICIAN ASSISTANT

## 2021-07-22 PROCEDURE — 1125F PR PAIN SEVERITY QUANTIFIED, PAIN PRESENT: ICD-10-PCS | Mod: CPTII,S$GLB,, | Performed by: PHYSICIAN ASSISTANT

## 2021-07-22 PROCEDURE — 73564 X-RAY EXAM KNEE 4 OR MORE: CPT | Mod: 26,,, | Performed by: RADIOLOGY

## 2021-07-22 RX ORDER — METHYLPREDNISOLONE 4 MG/1
TABLET ORAL
Qty: 1 PACKAGE | Refills: 0 | Status: SHIPPED | OUTPATIENT
Start: 2021-07-22 | End: 2022-02-22

## 2021-08-16 ENCOUNTER — LAB VISIT (OUTPATIENT)
Dept: PRIMARY CARE CLINIC | Facility: OTHER | Age: 56
End: 2021-08-16
Payer: COMMERCIAL

## 2021-08-16 DIAGNOSIS — R05.9 COUGH: ICD-10-CM

## 2021-08-16 PROCEDURE — U0003 INFECTIOUS AGENT DETECTION BY NUCLEIC ACID (DNA OR RNA); SEVERE ACUTE RESPIRATORY SYNDROME CORONAVIRUS 2 (SARS-COV-2) (CORONAVIRUS DISEASE [COVID-19]), AMPLIFIED PROBE TECHNIQUE, MAKING USE OF HIGH THROUGHPUT TECHNOLOGIES AS DESCRIBED BY CMS-2020-01-R: HCPCS

## 2021-08-17 LAB — SARS-COV-2 RNA RESP QL NAA+PROBE: NOT DETECTED

## 2022-02-22 ENCOUNTER — OFFICE VISIT (OUTPATIENT)
Dept: HEMATOLOGY/ONCOLOGY | Facility: CLINIC | Age: 57
End: 2022-02-22
Payer: COMMERCIAL

## 2022-02-22 VITALS
HEART RATE: 75 BPM | WEIGHT: 228.81 LBS | OXYGEN SATURATION: 98 % | TEMPERATURE: 98 F | BODY MASS INDEX: 29.37 KG/M2 | DIASTOLIC BLOOD PRESSURE: 81 MMHG | SYSTOLIC BLOOD PRESSURE: 128 MMHG | HEIGHT: 74 IN

## 2022-02-22 DIAGNOSIS — Z86.711 HISTORY OF PULMONARY EMBOLISM: ICD-10-CM

## 2022-02-22 DIAGNOSIS — Z79.01 CHRONIC ANTICOAGULATION: ICD-10-CM

## 2022-02-22 DIAGNOSIS — D68.52 HETEROZYGOUS FOR PROTHROMBIN G20210A MUTATION: Primary | ICD-10-CM

## 2022-02-22 DIAGNOSIS — Z86.718 HISTORY OF DVT (DEEP VEIN THROMBOSIS): ICD-10-CM

## 2022-02-22 PROCEDURE — 3074F PR MOST RECENT SYSTOLIC BLOOD PRESSURE < 130 MM HG: ICD-10-PCS | Mod: CPTII,S$GLB,, | Performed by: INTERNAL MEDICINE

## 2022-02-22 PROCEDURE — 99999 PR PBB SHADOW E&M-EST. PATIENT-LVL III: ICD-10-PCS | Mod: PBBFAC,,, | Performed by: INTERNAL MEDICINE

## 2022-02-22 PROCEDURE — 99999 PR PBB SHADOW E&M-EST. PATIENT-LVL III: CPT | Mod: PBBFAC,,, | Performed by: INTERNAL MEDICINE

## 2022-02-22 PROCEDURE — 3074F SYST BP LT 130 MM HG: CPT | Mod: CPTII,S$GLB,, | Performed by: INTERNAL MEDICINE

## 2022-02-22 PROCEDURE — 3008F BODY MASS INDEX DOCD: CPT | Mod: CPTII,S$GLB,, | Performed by: INTERNAL MEDICINE

## 2022-02-22 PROCEDURE — 3079F DIAST BP 80-89 MM HG: CPT | Mod: CPTII,S$GLB,, | Performed by: INTERNAL MEDICINE

## 2022-02-22 PROCEDURE — 1159F PR MEDICATION LIST DOCUMENTED IN MEDICAL RECORD: ICD-10-PCS | Mod: CPTII,S$GLB,, | Performed by: INTERNAL MEDICINE

## 2022-02-22 PROCEDURE — 1160F PR REVIEW ALL MEDS BY PRESCRIBER/CLIN PHARMACIST DOCUMENTED: ICD-10-PCS | Mod: CPTII,S$GLB,, | Performed by: INTERNAL MEDICINE

## 2022-02-22 PROCEDURE — 99213 OFFICE O/P EST LOW 20 MIN: CPT | Mod: S$GLB,,, | Performed by: INTERNAL MEDICINE

## 2022-02-22 PROCEDURE — 99213 PR OFFICE/OUTPT VISIT, EST, LEVL III, 20-29 MIN: ICD-10-PCS | Mod: S$GLB,,, | Performed by: INTERNAL MEDICINE

## 2022-02-22 PROCEDURE — 3008F PR BODY MASS INDEX (BMI) DOCUMENTED: ICD-10-PCS | Mod: CPTII,S$GLB,, | Performed by: INTERNAL MEDICINE

## 2022-02-22 PROCEDURE — 3079F PR MOST RECENT DIASTOLIC BLOOD PRESSURE 80-89 MM HG: ICD-10-PCS | Mod: CPTII,S$GLB,, | Performed by: INTERNAL MEDICINE

## 2022-02-22 PROCEDURE — 1159F MED LIST DOCD IN RCRD: CPT | Mod: CPTII,S$GLB,, | Performed by: INTERNAL MEDICINE

## 2022-02-22 PROCEDURE — 1160F RVW MEDS BY RX/DR IN RCRD: CPT | Mod: CPTII,S$GLB,, | Performed by: INTERNAL MEDICINE

## 2022-02-22 NOTE — PROGRESS NOTES
Subjective:      DATE OF VISIT: 2/22/22     ?  Patient ID:?Jean-Paul Reeves is a 56 y.o. male.?? MR#: [id number]   ?     ? Primary Care Providers:  Yosvany Winn MD, MD (General)     CHIEF COMPLAINT: ?  Follow-up, history of DVT/PE/heterozygote prothrombin gene mutation??   ?   HPI     Mr. Reeves  Returns for follow-up for heterozygote mutation prothrombin gene mutation and history of DVT/PE.  He had 1st thrombosis in September 2011 with acute left lower extremity DVT located in the posterior tibial, popliteal and femoral veins and bilateral pulmonary embolism.  He maintains chronic anticoagulation with Xarelto 20 mg daily tolerating well without missed doses.  He was last seen by nurse practitioner Misa Singleton 12/05/2019.  He is up-to-date on age-appropriate cancer screening he notes having primary care outside of [facility] and believes he is getting PSA screening however is unsure, last in our system 2014 within normal limits.  No  symptoms or bony pain.  Last colonoscopy 2012 recommended 10 year follow-up. No family history of malignancy.     Interval events:  No changes since our last visit he has remained healthy on rivaroxaban 20 mg daily without missed doses.  No new lower extremity edema, pain, chest pain or shortness of breath.  No bleeding complications.  He is not on other blood thinners.    He notes being up-to-date on age-appropriate cancer screening including colonoscopy.       Objective:      Physical Exam      ?   Vitals:    02/22/22 1501   BP: 128/81   Pulse: 75   Temp: 98 °F (36.7 °C)      ECOG:?0   General appearance: Generally well appearing, in no acute distress.   Head, eyes, ears, nose, and throat: moist mucous membranes.   Respiratory:  Normal work of breathing  Abdomen: nontender, nondistended.   Extremities: Warm, without edema.    Neurologic: Alert and oriented. Grossly normal strength, coordination, and gait.   Skin: No rashes, ecchymoses or petechial lesion.   Psychiatric:  Normal mood  and affect.    ?   Laboratory:  ?   No visits with results within 1 Day(s) from this visit.   Latest known visit with results is:   Lab Visit on 08/16/2021   Component Date Value Ref Range Status    SARS-CoV2 (COVID-19) Qualitative P* 08/16/2021 Not Detected  Not Detected Final          ?   Assessment/Plan:   Heterozygous for prothrombin [id number] mutation    History of DVT (deep vein thrombosis)    History of pulmonary embolism    Chronic anticoagulation       1. Heterozygous for prothrombin [id number] mutation    2. History of DVT (deep vein thrombosis)    3. History of pulmonary embolism    4. Chronic anticoagulation          Plan:     # history of DVT/PE 2011, heterozygote for prothrombin gene mutation:  On chronic anticoagulation with rivaroxaban 20 mg daily taking as prescribed without missed doses.   Given significant unprovoked DVT/ PE recommendation for indefinite anticoagulation.  He has been tolerating well without bleeding complications.  He does understand if procedure will need to hold briefly prior to procedure and resuming afterwards.       Follow-Up:   Patient Instructions   RV in 1 year

## 2022-04-12 DIAGNOSIS — I82.4Y2 ACUTE DEEP VEIN THROMBOSIS (DVT) OF PROXIMAL VEIN OF LEFT LOWER EXTREMITY: ICD-10-CM

## 2022-07-06 ENCOUNTER — HOSPITAL ENCOUNTER (EMERGENCY)
Facility: HOSPITAL | Age: 57
Discharge: HOME | End: 2022-07-06
Attending: EMERGENCY MEDICINE
Payer: COMMERCIAL

## 2022-07-06 VITALS
WEIGHT: 217.13 LBS | SYSTOLIC BLOOD PRESSURE: 147 MMHG | DIASTOLIC BLOOD PRESSURE: 90 MMHG | OXYGEN SATURATION: 98 % | TEMPERATURE: 98 F | HEART RATE: 88 BPM | BODY MASS INDEX: 27.88 KG/M2 | RESPIRATION RATE: 22 BRPM

## 2022-07-06 DIAGNOSIS — R06.02 SHORTNESS OF BREATH: ICD-10-CM

## 2022-07-06 DIAGNOSIS — R79.89 POSITIVE D DIMER: ICD-10-CM

## 2022-07-06 DIAGNOSIS — R06.02 SOB (SHORTNESS OF BREATH): Primary | ICD-10-CM

## 2022-07-06 LAB
ALBUMIN SERPL BCP-MCNC: 4 G/DL (ref 3.5–5.2)
ALP SERPL-CCNC: 77 U/L (ref 55–135)
ALT SERPL W/O P-5'-P-CCNC: 21 U/L (ref 10–44)
ANION GAP SERPL CALC-SCNC: 12 MMOL/L (ref 8–16)
AST SERPL-CCNC: 20 U/L (ref 10–40)
BASOPHILS # BLD AUTO: 0.03 K/UL (ref 0–0.2)
BASOPHILS NFR BLD: 0.5 % (ref 0–1.9)
BILIRUB SERPL-MCNC: 0.6 MG/DL (ref 0.1–1)
BNP SERPL-MCNC: 10 PG/ML (ref 0–99)
BUN SERPL-MCNC: 11 MG/DL (ref 6–20)
CALCIUM SERPL-MCNC: 9.6 MG/DL (ref 8.7–10.5)
CHLORIDE SERPL-SCNC: 103 MMOL/L (ref 95–110)
CO2 SERPL-SCNC: 23 MMOL/L (ref 23–29)
CREAT SERPL-MCNC: 1.4 MG/DL (ref 0.5–1.4)
D DIMER PPP IA.FEU-MCNC: 1.8 MG/L FEU
DIFFERENTIAL METHOD: NORMAL
EOSINOPHIL # BLD AUTO: 0.2 K/UL (ref 0–0.5)
EOSINOPHIL NFR BLD: 3.3 % (ref 0–8)
ERYTHROCYTE [DISTWIDTH] IN BLOOD BY AUTOMATED COUNT: 14.1 % (ref 11.5–14.5)
EST. GFR  (AFRICAN AMERICAN): >60 ML/MIN/1.73 M^2
EST. GFR  (NON AFRICAN AMERICAN): 55 ML/MIN/1.73 M^2
GLUCOSE SERPL-MCNC: 91 MG/DL (ref 70–110)
HCT VFR BLD AUTO: 51.2 % (ref 40–54)
HCV AB SERPL QL IA: NEGATIVE
HEP C VIRUS HOLD SPECIMEN: NORMAL
HGB BLD-MCNC: 16.4 G/DL (ref 14–18)
HIV 1+2 AB+HIV1 P24 AG SERPL QL IA: NEGATIVE
IMM GRANULOCYTES # BLD AUTO: 0.01 K/UL (ref 0–0.04)
IMM GRANULOCYTES NFR BLD AUTO: 0.2 % (ref 0–0.5)
LYMPHOCYTES # BLD AUTO: 2.9 K/UL (ref 1–4.8)
LYMPHOCYTES NFR BLD: 47 % (ref 18–48)
MCH RBC QN AUTO: 27 PG (ref 27–31)
MCHC RBC AUTO-ENTMCNC: 32 G/DL (ref 32–36)
MCV RBC AUTO: 84 FL (ref 82–98)
MONOCYTES # BLD AUTO: 0.6 K/UL (ref 0.3–1)
MONOCYTES NFR BLD: 10.3 % (ref 4–15)
NEUTROPHILS # BLD AUTO: 2.4 K/UL (ref 1.8–7.7)
NEUTROPHILS NFR BLD: 38.7 % (ref 38–73)
NRBC BLD-RTO: 0 /100 WBC
PLATELET # BLD AUTO: 200 K/UL (ref 150–450)
PMV BLD AUTO: 10.1 FL (ref 9.2–12.9)
POTASSIUM SERPL-SCNC: 4.6 MMOL/L (ref 3.5–5.1)
PROT SERPL-MCNC: 8.4 G/DL (ref 6–8.4)
RBC # BLD AUTO: 6.07 M/UL (ref 4.6–6.2)
SODIUM SERPL-SCNC: 138 MMOL/L (ref 136–145)
TROPONIN I SERPL DL<=0.01 NG/ML-MCNC: 0.01 NG/ML (ref 0–0.03)
WBC # BLD AUTO: 6.11 K/UL (ref 3.9–12.7)

## 2022-07-06 PROCEDURE — 80053 COMPREHEN METABOLIC PANEL: CPT | Performed by: NURSE PRACTITIONER

## 2022-07-06 PROCEDURE — 85379 FIBRIN DEGRADATION QUANT: CPT | Performed by: NURSE PRACTITIONER

## 2022-07-06 PROCEDURE — 87389 HIV-1 AG W/HIV-1&-2 AB AG IA: CPT | Performed by: NURSE PRACTITIONER

## 2022-07-06 PROCEDURE — 93010 ELECTROCARDIOGRAM REPORT: CPT | Mod: ,,, | Performed by: INTERNAL MEDICINE

## 2022-07-06 PROCEDURE — 25500020 PHARM REV CODE 255: Performed by: NURSE PRACTITIONER

## 2022-07-06 PROCEDURE — 93005 ELECTROCARDIOGRAM TRACING: CPT

## 2022-07-06 PROCEDURE — 85025 COMPLETE CBC W/AUTO DIFF WBC: CPT | Performed by: NURSE PRACTITIONER

## 2022-07-06 PROCEDURE — 84484 ASSAY OF TROPONIN QUANT: CPT | Performed by: NURSE PRACTITIONER

## 2022-07-06 PROCEDURE — 99285 EMERGENCY DEPT VISIT HI MDM: CPT | Mod: 25

## 2022-07-06 PROCEDURE — 83880 ASSAY OF NATRIURETIC PEPTIDE: CPT | Performed by: NURSE PRACTITIONER

## 2022-07-06 PROCEDURE — 93010 EKG 12-LEAD: ICD-10-PCS | Mod: ,,, | Performed by: INTERNAL MEDICINE

## 2022-07-06 PROCEDURE — 36000 PLACE NEEDLE IN VEIN: CPT

## 2022-07-06 PROCEDURE — 86803 HEPATITIS C AB TEST: CPT | Performed by: NURSE PRACTITIONER

## 2022-07-06 RX ADMIN — IOHEXOL 100 ML: 350 INJECTION, SOLUTION INTRAVENOUS at 11:07

## 2022-07-06 NOTE — ED PROVIDER NOTES
HISTORY     Chief Complaint   Patient presents with    Shortness of Breath     Tested positive for COVID on Sunday. Woke up this morning with more SOB. +fever, cough, congestion.      Review of patient's allergies indicates:  No Known Allergies     HPI   The history is provided by the patient. No  was used.   Shortness of Breath  This is a new problem. The current episode started yesterday. Associated symptoms include a fever and cough. Pertinent negatives include no headaches, no rhinorrhea, no sore throat, no swollen glands, no chest pain, no abdominal pain, no rash and no leg pain.    reports dx with covid on Sunday. Reports sob worsened yesterday    PCP: Yosvany Winn MD     Past Medical History:  Past Medical History:   Diagnosis Date    Anticoagulant long-term use     started 5-6 years ago Xarelto    Blood clot in vein     Heterozygous for prothrombin [id number] mutation     Pulmonary embolism     Tachycardia         Past Surgical History:  Past Surgical History:   Procedure Laterality Date    APPENDECTOMY      HERNIA REPAIR      Inguinal        Family History:  Family History   Problem Relation Age of Onset    Cancer Mother         Social History:  Social History     Tobacco Use    Smoking status: Never Smoker    Smokeless tobacco: Never Used   Substance and Sexual Activity    Alcohol use: Yes     Comment: occasionally, stop alcohol as of today    Drug use: No    Sexual activity: Not on file         ROS   Review of Systems   Constitutional: Positive for fever.   HENT: Negative for rhinorrhea and sore throat.    Respiratory: Positive for cough and shortness of breath.    Cardiovascular: Negative for chest pain.   Gastrointestinal: Negative for abdominal pain and nausea.   Genitourinary: Negative for dysuria.   Musculoskeletal: Negative for back pain.   Skin: Negative for rash.   Neurological: Negative for weakness and headaches.   Hematological: Does not bruise/bleed  easily.       PHYSICAL EXAM     Initial Vitals   BP Pulse Resp Temp SpO2   07/06/22 0751 07/06/22 0751 07/06/22 0751 07/06/22 0753 07/06/22 0751   (!) 148/89 105 20 98.8 °F (37.1 °C) 97 %      MAP       --                  Physical Exam    Nursing note and vitals reviewed.  Constitutional: He appears well-developed and well-nourished. He is not diaphoretic. No distress.   HENT:   Head: Normocephalic and atraumatic.   Eyes: Right eye exhibits no discharge. Left eye exhibits no discharge.   Neck: Neck supple.   Normal range of motion.  Cardiovascular: Normal rate.   Pulmonary/Chest: No respiratory distress.   Abdominal: Abdomen is soft. He exhibits no distension. There is no abdominal tenderness.   Musculoskeletal:         General: Normal range of motion.      Cervical back: Normal range of motion and neck supple.     Neurological: He is alert and oriented to person, place, and time. He has normal strength.   Skin: Skin is warm and dry.   Psychiatric: He has a normal mood and affect. His behavior is normal. Thought content normal.          ED COURSE   Procedures  ED ONGOING VITALS:  Vitals:    07/06/22 0750 07/06/22 0751 07/06/22 0753 07/06/22 1000   BP:  (!) 148/89  (!) 153/91   Pulse:  105  88   Resp:  20  15   Temp:   98.8 °F (37.1 °C)    TempSrc:  Oral Oral    SpO2:  97%  100%   Weight: 98.5 kg (217 lb 2.5 oz)            ABNORMAL LAB VALUES:  Labs Reviewed   COMPREHENSIVE METABOLIC PANEL - Abnormal; Notable for the following components:       Result Value    eGFR if non  55 (*)     All other components within normal limits   D DIMER, QUANTITATIVE - Abnormal; Notable for the following components:    D-Dimer 1.80 (*)     All other components within normal limits   CBC W/ AUTO DIFFERENTIAL   TROPONIN I   B-TYPE NATRIURETIC PEPTIDE   HIV 1 / 2 ANTIBODY    Narrative:     Release to patient->Immediate   HEPATITIS C ANTIBODY    Narrative:     Release to patient->Immediate   HEP C VIRUS HOLD SPECIMEN     Narrative:     Release to patient->Immediate         ALL LAB VALUES:  Results for orders placed or performed during the hospital encounter of 07/06/22   CBC Auto Differential   Result Value Ref Range    WBC 6.11 3.90 - 12.70 K/uL    RBC 6.07 4.60 - 6.20 M/uL    Hemoglobin 16.4 14.0 - 18.0 g/dL    Hematocrit 51.2 40.0 - 54.0 %    MCV 84 82 - 98 fL    MCH 27.0 27.0 - 31.0 pg    MCHC 32.0 32.0 - 36.0 g/dL    RDW 14.1 11.5 - 14.5 %    Platelets 200 150 - 450 K/uL    MPV 10.1 9.2 - 12.9 fL    Immature Granulocytes 0.2 0.0 - 0.5 %    Gran # (ANC) 2.4 1.8 - 7.7 K/uL    Immature Grans (Abs) 0.01 0.00 - 0.04 K/uL    Lymph # 2.9 1.0 - 4.8 K/uL    Mono # 0.6 0.3 - 1.0 K/uL    Eos # 0.2 0.0 - 0.5 K/uL    Baso # 0.03 0.00 - 0.20 K/uL    nRBC 0 0 /100 WBC    Gran % 38.7 38.0 - 73.0 %    Lymph % 47.0 18.0 - 48.0 %    Mono % 10.3 4.0 - 15.0 %    Eosinophil % 3.3 0.0 - 8.0 %    Basophil % 0.5 0.0 - 1.9 %    Differential Method Automated    Comprehensive Metabolic Panel   Result Value Ref Range    Sodium 138 136 - 145 mmol/L    Potassium 4.6 3.5 - 5.1 mmol/L    Chloride 103 95 - 110 mmol/L    CO2 23 23 - 29 mmol/L    Glucose 91 70 - 110 mg/dL    BUN 11 6 - 20 mg/dL    Creatinine 1.4 0.5 - 1.4 mg/dL    Calcium 9.6 8.7 - 10.5 mg/dL    Total Protein 8.4 6.0 - 8.4 g/dL    Albumin 4.0 3.5 - 5.2 g/dL    Total Bilirubin 0.6 0.1 - 1.0 mg/dL    Alkaline Phosphatase 77 55 - 135 U/L    AST 20 10 - 40 U/L    ALT 21 10 - 44 U/L    Anion Gap 12 8 - 16 mmol/L    eGFR if African American >60 >60 mL/min/1.73 m^2    eGFR if non African American 55 (A) >60 mL/min/1.73 m^2   Troponin I   Result Value Ref Range    Troponin I 0.007 0.000 - 0.026 ng/mL   Brain natriuretic peptide   Result Value Ref Range    BNP 10 0 - 99 pg/mL   D dimer, quantitative   Result Value Ref Range    D-Dimer 1.80 (H) <0.50 mg/L FEU   HIV 1/2 Ag/Ab (4th Gen)   Result Value Ref Range    HIV 1/2 Ag/Ab Negative Negative   Hepatitis C Antibody   Result Value Ref Range    Hepatitis C  Ab Negative Negative   HCV Virus Hold Specimen   Result Value Ref Range    HEP C Virus Hold Specimen Hold for HCV sendout            RADIOLOGY STUDIES:  Imaging Results          CTA Chest Non-Coronary (PE Study) (Final result)  Result time 07/06/22 11:57:28    Final result by Israel Cleveland MD (07/06/22 11:57:28)                 Impression:      No evidence of pulmonary embolism.    See additional findings above    All CT scans at this facility use dose modulation, iterative reconstruction and/or weight based dosing when appropriate to reduce radiation dose to as low as reasonably achievable.      Electronically signed by: Israel Cleveland MD  Date:    07/06/2022  Time:    11:57             Narrative:    EXAMINATION:  CTA CHEST NON CORONARY    CLINICAL HISTORY:  Chest pain and shortness of breath    TECHNIQUE:  After the intravenous administration of 100 cc of Omni 350 nonionic contrast using CT pulmonary angio technique, 1.25  Mm axial images were acquired using helical CT technique from the lung apices through costophrenic sulci.  Sagittal coronal and oblique MIPS were also submitted for interpretation.    COMPARISON:  Chest x-ray dated 07/06/2022    FINDINGS:  -Pulmonary arteries: Pulmonary arteries are well opacified.  No evidence of pulmonary embolism.  No evidence of pulmonary hypertension.  No right heart strain is identified with RV/LV ratio < 0.9.    -Lungs: No nodules or infiltrates.  Mild peribronchial thickening which can be seen with viral process.  Shotty nodes are seen within mediastinum bilateral hilar regions.    -Pleura: No thickening or fluid.    -Mediastinum/Nadya:No significant adenopathy    -Axilla: No adenopathy.    -Thyroid: Normal lower gland.    -Heart/Aorta: Heart size is borderline enlarged.  Moderate  coronary artery disease.  No pericardial effusion. Aorta normal caliber.   Aorta demonstrates moderate atherosclerotic disease.    -Bones/Chest Wall: Intact .  Mild scoliosis.  Mild  gynecomastia.    -Upper Abdomen: Unremarkable                               X-Ray Chest 1 View (Final result)  Result time 07/06/22 08:45:20    Final result by Yosvany Schneider MD (07/06/22 08:45:20)                 Impression:      No acute abnormality.      Electronically signed by: Yosvany Schneider  Date:    07/06/2022  Time:    08:45             Narrative:    EXAMINATION:  XR CHEST 1 VIEW    CLINICAL HISTORY:  shortness of breath;    TECHNIQUE:  Single frontal view of the chest was performed.    COMPARISON:  Chest radiograph 06/03/2019    FINDINGS:  The lungs are clear, with normal appearance of pulmonary vasculature and no pleural effusion or pneumothorax.    The cardiac silhouette is normal in size. The hilar and mediastinal contours are unchanged.    Bones are intact.                                  EKG Readings: (Independently Interpreted)   Initial Reading: No STEMI. Rhythm: Normal Sinus Rhythm. Heart Rate: 98.          The above vital signs and test results have been reviewed by the emergency provider.     ED Medications:  Current Discharge Medication List        Discharge Medications:  New Prescriptions    No medications on file       Follow-up Information     Yosvany Winn MD.    Specialty: Family Medicine  Why: As needed  Contact information:  [address] [facility] [address]  [phone]             [facility] - Emergency Dept..    Specialty: Emergency Medicine  Why: If symptoms worsen  Contact information:  [address] [facility] [id number] [phone]                      12:28 PM    I discussed with patient and/or family/caretaker that evaluation in the ED does not suggest any emergent or life threatening medical conditions requiring immediate intervention beyond what was provided in the ED, and I believe patient is safe for discharge. Regardless, an unremarkable evaluation in the ED does not preclude the development or presence of a serious or life  threatening condition. As such, patient was instructed to return immediately for any worsening or change in current symptoms.          MEDICAL DECISION MAKING                 CLINICAL IMPRESSION       ICD-10-CM ICD-9-CM   1. SOB (shortness of breath)  R06.02 786.05   2. Shortness of breath  R06.02 786.05   3. Positive D dimer  R79.89 790.92       Disposition:   Disposition: Discharged  Condition: Stable         David Cade NP  07/06/22 1228

## 2022-12-01 ENCOUNTER — LAB VISIT (OUTPATIENT)
Dept: LAB | Facility: HOSPITAL | Age: 57
End: 2022-12-01
Attending: FAMILY MEDICINE
Payer: COMMERCIAL

## 2022-12-01 ENCOUNTER — OFFICE VISIT (OUTPATIENT)
Dept: INTERNAL MEDICINE | Facility: CLINIC | Age: 57
End: 2022-12-01
Payer: COMMERCIAL

## 2022-12-01 VITALS
OXYGEN SATURATION: 97 % | SYSTOLIC BLOOD PRESSURE: 138 MMHG | HEART RATE: 88 BPM | RESPIRATION RATE: 18 BRPM | WEIGHT: 219.13 LBS | BODY MASS INDEX: 28.12 KG/M2 | TEMPERATURE: 98 F | DIASTOLIC BLOOD PRESSURE: 88 MMHG | HEIGHT: 74 IN

## 2022-12-01 DIAGNOSIS — Z86.718 HISTORY OF DVT (DEEP VEIN THROMBOSIS): ICD-10-CM

## 2022-12-01 DIAGNOSIS — R73.09 ELEVATED RANDOM BLOOD GLUCOSE LEVEL: ICD-10-CM

## 2022-12-01 DIAGNOSIS — Z79.899 ENCOUNTER FOR LONG-TERM CURRENT USE OF MEDICATION: ICD-10-CM

## 2022-12-01 DIAGNOSIS — I47.10 PSVT (PAROXYSMAL SUPRAVENTRICULAR TACHYCARDIA): ICD-10-CM

## 2022-12-01 DIAGNOSIS — Z12.5 SCREENING FOR PROSTATE CANCER: ICD-10-CM

## 2022-12-01 DIAGNOSIS — D68.59 PRIMARY HYPERCOAGULABLE STATE: ICD-10-CM

## 2022-12-01 DIAGNOSIS — J45.30 MILD PERSISTENT ASTHMA WITHOUT COMPLICATION: Primary | ICD-10-CM

## 2022-12-01 DIAGNOSIS — Z86.711 HISTORY OF PULMONARY EMBOLISM: ICD-10-CM

## 2022-12-01 PROCEDURE — 90686 FLU VACCINE (QUAD) GREATER THAN OR EQUAL TO 3YO PRESERVATIVE FREE IM: ICD-10-PCS | Mod: S$GLB,,, | Performed by: FAMILY MEDICINE

## 2022-12-01 PROCEDURE — 90471 FLU VACCINE (QUAD) GREATER THAN OR EQUAL TO 3YO PRESERVATIVE FREE IM: ICD-10-PCS | Mod: S$GLB,,, | Performed by: FAMILY MEDICINE

## 2022-12-01 PROCEDURE — 1159F PR MEDICATION LIST DOCUMENTED IN MEDICAL RECORD: ICD-10-PCS | Mod: CPTII,S$GLB,, | Performed by: FAMILY MEDICINE

## 2022-12-01 PROCEDURE — 1159F MED LIST DOCD IN RCRD: CPT | Mod: CPTII,S$GLB,, | Performed by: FAMILY MEDICINE

## 2022-12-01 PROCEDURE — 3008F PR BODY MASS INDEX (BMI) DOCUMENTED: ICD-10-PCS | Mod: CPTII,S$GLB,, | Performed by: FAMILY MEDICINE

## 2022-12-01 PROCEDURE — 84153 ASSAY OF PSA TOTAL: CPT | Performed by: FAMILY MEDICINE

## 2022-12-01 PROCEDURE — 3075F PR MOST RECENT SYSTOLIC BLOOD PRESS GE 130-139MM HG: ICD-10-PCS | Mod: CPTII,S$GLB,, | Performed by: FAMILY MEDICINE

## 2022-12-01 PROCEDURE — 3044F PR MOST RECENT HEMOGLOBIN A1C LEVEL <7.0%: ICD-10-PCS | Mod: CPTII,S$GLB,, | Performed by: FAMILY MEDICINE

## 2022-12-01 PROCEDURE — 80061 LIPID PANEL: CPT | Performed by: FAMILY MEDICINE

## 2022-12-01 PROCEDURE — 83036 HEMOGLOBIN GLYCOSYLATED A1C: CPT | Performed by: FAMILY MEDICINE

## 2022-12-01 PROCEDURE — 3079F DIAST BP 80-89 MM HG: CPT | Mod: CPTII,S$GLB,, | Performed by: FAMILY MEDICINE

## 2022-12-01 PROCEDURE — 99204 PR OFFICE/OUTPT VISIT, NEW, LEVL IV, 45-59 MIN: ICD-10-PCS | Mod: 25,S$GLB,, | Performed by: FAMILY MEDICINE

## 2022-12-01 PROCEDURE — 3079F PR MOST RECENT DIASTOLIC BLOOD PRESSURE 80-89 MM HG: ICD-10-PCS | Mod: CPTII,S$GLB,, | Performed by: FAMILY MEDICINE

## 2022-12-01 PROCEDURE — 3008F BODY MASS INDEX DOCD: CPT | Mod: CPTII,S$GLB,, | Performed by: FAMILY MEDICINE

## 2022-12-01 PROCEDURE — 99999 PR PBB SHADOW E&M-EST. PATIENT-LVL IV: ICD-10-PCS | Mod: PBBFAC,,, | Performed by: FAMILY MEDICINE

## 2022-12-01 PROCEDURE — 3075F SYST BP GE 130 - 139MM HG: CPT | Mod: CPTII,S$GLB,, | Performed by: FAMILY MEDICINE

## 2022-12-01 PROCEDURE — 90471 IMMUNIZATION ADMIN: CPT | Mod: S$GLB,,, | Performed by: FAMILY MEDICINE

## 2022-12-01 PROCEDURE — 99204 OFFICE O/P NEW MOD 45 MIN: CPT | Mod: 25,S$GLB,, | Performed by: FAMILY MEDICINE

## 2022-12-01 PROCEDURE — 3044F HG A1C LEVEL LT 7.0%: CPT | Mod: CPTII,S$GLB,, | Performed by: FAMILY MEDICINE

## 2022-12-01 PROCEDURE — 36415 COLL VENOUS BLD VENIPUNCTURE: CPT | Performed by: FAMILY MEDICINE

## 2022-12-01 PROCEDURE — 90686 IIV4 VACC NO PRSV 0.5 ML IM: CPT | Mod: S$GLB,,, | Performed by: FAMILY MEDICINE

## 2022-12-01 PROCEDURE — 99999 PR PBB SHADOW E&M-EST. PATIENT-LVL IV: CPT | Mod: PBBFAC,,, | Performed by: FAMILY MEDICINE

## 2022-12-01 NOTE — PROGRESS NOTES
Subjective:       Patient ID: Jean-Paul Reeves is a 57 y.o. male.    Chief Complaint: Hypertension and Annual Exam    Hypertension  Pertinent negatives include no chest pain or shortness of breath.     57    Patient Active Problem List   Diagnosis    History of pulmonary embolism    Primary hypercoagulable state    Internal derangement of knee joint    Chondromalacia of left knee    PSVT (paroxysmal supraventricular tachycardia)    History of DVT (deep vein thrombosis)    Post-phlebitic syndrome    Mild persistent asthma without complication    Heterozygous for prothrombin [id number] mutation       Past Medical History:   Diagnosis Date    Anticoagulant long-term use     started 5-6 years ago Xarelto    Blood clot in vein     Heterozygous for prothrombin [id number] mutation     Pulmonary embolism     Tachycardia        Past Surgical History:   Procedure Laterality Date    APPENDECTOMY      HERNIA REPAIR      Inguinal       Family History   Problem Relation Age of Onset    Cancer Mother         uncertain       Social History     Tobacco Use   Smoking Status Never   Smokeless Tobacco Never       Wt Readings from Last 5 Encounters:   12/01/22 99.4 kg (219 lb 2.2 oz)   07/06/22 98.5 kg (217 lb 2.5 oz)   02/22/22 103.8 kg (228 lb 13.4 oz)   07/22/21 100.2 kg (220 lb 14.4 oz)   12/22/20 100.2 kg (220 lb 14.4 oz)       For further HPI details, see assessment and plan.    Review of Systems   Constitutional:  Negative for chills and fever.   Respiratory:  Negative for shortness of breath.    Cardiovascular:  Negative for chest pain.   Neurological:  Negative for dizziness.     Objective:      Vitals:    12/01/22 1342   BP: 138/88   Pulse: 88   Resp: 18   Temp: 98.2 °F (36.8 °C)       Physical Exam  Constitutional:       General: He is not in acute distress.     Appearance: He is not ill-appearing.   Cardiovascular:      Rate and Rhythm: Normal rate and regular rhythm.   Pulmonary:      Effort: Pulmonary effort is normal. No  respiratory distress.   Neurological:      General: No focal deficit present.      Mental Status: He is alert.   Psychiatric:         Mood and Affect: Mood normal.         Behavior: Behavior normal.       Assessment:       1. Mild persistent asthma without complication    2. PSVT (paroxysmal supraventricular tachycardia)    3. History of pulmonary embolism    4. Primary hypercoagulable state    5. History of DVT (deep vein thrombosis)    6. Screening for prostate cancer    7. Elevated random blood glucose level    8. Encounter for long-term current use of medication        Plan:     Establish care    H/o DVT/PE 2011  Heterozygot for prothrombin gene mutation  On anti coag  No bleeding  Knows to avoid nsaids  Continue med    H/o PSVT   No meds but doing well.  This was years ago -       H/o asthma  No issues since youth    Presents today -   Concern of pressure  Has been higher than normal  [place] - found his pressures 176/105  Has been checking at home.  At home has been running 150s/160s systolic  No other history of HTN.    Given controlled in office today I want to log pressures I ask he brings in log and machine in 2 weeks or so for nursing visit    IF his pressures are elevated ( 140/90 ) and his machine is accurate - plan on initiation of medication - amlodipine - with intention to f/u w me 2 weeks or so after that.    To complicated the matter I will potentially not be in office - given paternity - if that time line doesn't work we will arrange for SHARRON follow up.  With intention to f/u w myself after that.      Flu shot     Hasn't been exercising.  Encouraged that and lifestyle as means to manage health.    2 week nursing visit  If high 2 week f/u me  If normal 6 m f/u    Colon records to be signed for

## 2022-12-02 LAB
CHOLEST SERPL-MCNC: 208 MG/DL (ref 120–199)
CHOLEST/HDLC SERPL: 5 {RATIO} (ref 2–5)
COMPLEXED PSA SERPL-MCNC: 0.44 NG/ML (ref 0–4)
ESTIMATED AVG GLUCOSE: 111 MG/DL (ref 68–131)
HBA1C MFR BLD: 5.5 % (ref 4–5.6)
HDLC SERPL-MCNC: 42 MG/DL (ref 40–75)
HDLC SERPL: 20.2 % (ref 20–50)
LDLC SERPL CALC-MCNC: 139.6 MG/DL (ref 63–159)
NONHDLC SERPL-MCNC: 166 MG/DL
TRIGL SERPL-MCNC: 132 MG/DL (ref 30–150)

## 2022-12-03 ENCOUNTER — HOSPITAL ENCOUNTER (EMERGENCY)
Facility: HOSPITAL | Age: 57
Discharge: HOME | End: 2022-12-04
Attending: EMERGENCY MEDICINE
Payer: COMMERCIAL

## 2022-12-03 DIAGNOSIS — M25.521 BILATERAL ELBOW JOINT PAIN: ICD-10-CM

## 2022-12-03 DIAGNOSIS — M25.522 BILATERAL ELBOW JOINT PAIN: ICD-10-CM

## 2022-12-03 DIAGNOSIS — S50.02XA CONTUSION OF LEFT ELBOW, INITIAL ENCOUNTER: Primary | ICD-10-CM

## 2022-12-03 PROCEDURE — 99283 EMERGENCY DEPT VISIT LOW MDM: CPT | Mod: 25

## 2022-12-03 RX ORDER — HYDROCODONE BITARTRATE AND ACETAMINOPHEN 10; 325 MG/1; MG/1
1 TABLET ORAL
Status: COMPLETED | OUTPATIENT
Start: 2022-12-03 | End: 2022-12-04

## 2022-12-04 VITALS
HEIGHT: 74 IN | WEIGHT: 221.44 LBS | SYSTOLIC BLOOD PRESSURE: 141 MMHG | TEMPERATURE: 98 F | RESPIRATION RATE: 18 BRPM | DIASTOLIC BLOOD PRESSURE: 93 MMHG | BODY MASS INDEX: 28.42 KG/M2 | OXYGEN SATURATION: 98 % | HEART RATE: 82 BPM

## 2022-12-04 PROCEDURE — 29105 APPLICATION LONG ARM SPLINT: CPT | Mod: LT

## 2022-12-04 PROCEDURE — 25000003 PHARM REV CODE 250: Performed by: EMERGENCY MEDICINE

## 2022-12-04 RX ORDER — HYDROCODONE BITARTRATE AND ACETAMINOPHEN 10; 325 MG/1; MG/1
1 TABLET ORAL EVERY 4 HOURS PRN
Qty: 30 TABLET | Refills: 0 | Status: SHIPPED | OUTPATIENT
Start: 2022-12-04 | End: 2023-01-18

## 2022-12-04 RX ADMIN — HYDROCODONE BITARTRATE AND ACETAMINOPHEN 1 TABLET: 10; 325 TABLET ORAL at 12:12

## 2022-12-04 NOTE — ED PROVIDER NOTES
Encounter Date: 12/3/2022       History     Chief Complaint   Patient presents with    Fall     Pt fell approx 4 ft from a ladder and broke his fall with the elbows, left elbow pain denies hitting head or LOC     Patient here for evaluation bilateral elbow as falling off a ladder.  He reports that he fell down a kind of braced himself with elbows.  Both elbows hurt but the left elbow hurts more than the right.  He is able to move the right elbow just reports an abrasion however the left elbow he has pain with range of motion.  No numbness or paresthesias.    No head trauma no chest pain or shortness breath no abdominal pain no back pain and no leg pain.    Review of patient's allergies indicates:  No Known Allergies  Past Medical History:   Diagnosis Date    Anticoagulant long-term use     started 5-6 years ago Xarelto    Blood clot in vein     Heterozygous for prothrombin [id number] mutation     Pulmonary embolism     Tachycardia      Past Surgical History:   Procedure Laterality Date    APPENDECTOMY      HERNIA REPAIR      Inguinal     Family History   Problem Relation Age of Onset    Cancer Mother         uncertain     Social History     Tobacco Use    Smoking status: Never    Smokeless tobacco: Never   Substance Use Topics    Alcohol use: Yes     Comment: occasionally, stop alcohol as of today    Drug use: No     Review of Systems   Constitutional:  Negative for fever.   HENT:  Negative for sore throat.    Respiratory:  Negative for shortness of breath.    Cardiovascular:  Negative for chest pain.   Gastrointestinal:  Negative for nausea.   Genitourinary:  Negative for dysuria.   Musculoskeletal:  Positive for joint swelling. Negative for back pain, gait problem, neck pain and neck stiffness.   Skin:  Negative for rash.   Neurological:  Negative for dizziness, weakness, numbness and headaches.   Hematological:  Does not bruise/bleed easily.     Physical Exam     Initial Vitals [12/03/22 2214]   BP Pulse Resp Temp  SpO2   (!) 141/93 82 18 98.3 °F (36.8 °C) 98 %      MAP       --         Physical Exam    Nursing note and vitals reviewed.  Constitutional: He appears well-developed and well-nourished.   HENT:   Head: Normocephalic and atraumatic.   Nose: Nose normal.   Mouth/Throat: Oropharynx is clear and moist.   Eyes: Conjunctivae and EOM are normal.   Neck: Neck supple.   Normal range of motion.  Cardiovascular:  Normal rate, regular rhythm, normal heart sounds and intact distal pulses.           Pulmonary/Chest: Breath sounds normal.   Abdominal: Abdomen is soft. Bowel sounds are normal.   Musculoskeletal:      Cervical back: Normal range of motion and neck supple.      Comments: Normal range of motion of the neck back hips he had normal gait.  The right arm had full range of motion including the elbow.  He does have an abrasion over the right elbow region but he has full range of motion with good supination and pronation radial pulse 2 +cap refill less than 2 seconds    The left arm he is holding it in flexion at the elbow about 90°.  He is able to flex it a little bit more to maybe 100° and he can extend to about 30°.  He is able to supinate and pronate but with a lot of pain.  Radial pulses 2+ cap refill less than 2 seconds.  He does have swelling to the posterior elbow region just above the elbow actually and at the lower posterior humeral region there is swelling noted but no open laceration     Neurological: He is alert and oriented to person, place, and time. He has normal strength.   Skin: Skin is warm and dry.   Psychiatric: He has a normal mood and affect. Thought content normal.       ED Course   Splint Application    Date/Time: 12/4/2022 12:36 AM  Performed by: Marlo Nguyen RN  Authorized by: Ren Michel Do, MD   Consent Done: Not Needed  Location details: left elbow  Splint type: long arm  Supplies used: Ortho-Glass  Post-procedure: The splinted body part was neurovascularly unchanged following the  procedure.  Patient tolerance: Patient tolerated the procedure well with no immediate complications      Labs Reviewed - No data to display       Imaging Results              X-Ray Elbow Complete Bilateral (Final result)  Result time 12/03/22 22:55:32      Final result by Kunal Huerta MD (12/03/22 22:55:32)                   Impression:      As above.      Electronically signed by: Kunal Huerta  Date:    12/03/2022  Time:    22:55               Narrative:    EXAMINATION:  XR ELBOW COMPLETE 3 VIEW BILATERAL    TECHNIQUE:  AP, lateral, and oblique views of bilateral elbow were performed.    COMPARISON:  None    FINDINGS:  No acute displaced fracture.  No traumatic malalignment.  No osseous destructive process.  Bilateral triceps enthesophytes right greater than left.    Probable left joint effusion with displacement of the fat pads on lateral radiograph.  Correlation and further evaluation as warranted.                                       Medications   HYDROcodone-acetaminophen  mg per tablet 1 tablet (1 tablet Oral Given 12/4/22 0005)         Patient was discharged in stable condition.  Ambulatory referral to orthopedics was done for him suspect he has an occult fracture not seen on x-ray.  He had anterior and posterior fat pad signs.  Was splinted and given a sling prior to discharge he was also given a prescription for Norco.                     Clinical Impression:   Final diagnoses:  [M25.521, M25.522] Bilateral elbow joint pain  [S50.02XA] Contusion of left elbow, initial encounter (Primary)        ED Disposition Condition    Discharge Stable          ED Prescriptions       Medication Sig Dispense Start Date End Date Auth. Provider    HYDROcodone-acetaminophen (NORCO)  mg per tablet Take 1 tablet by mouth every 4 (four) hours as needed for Pain. 30 tablet 12/4/2022 -- Ren Michel Do, MD          Follow-up Information       Follow up With Specialties Details Why Contact Info    Roldan KULKARNI  MD [facility] In 2 days  [address] [facility] [address]  [phone]               Ren Michel Do, MD  12/04/22 3221

## 2022-12-04 NOTE — FIRST PROVIDER EVALUATION
Medical screening examination initiated.  I have conducted a focused provider triage encounter, findings are as follows:    Brief history of present illness: fall 4 ft off ladder pta    There were no vitals filed for this visit.    Pertinent physical exam:  nad    Brief workup plan:  imaging     Preliminary workup initiated; this workup will be continued and followed by the physician or advanced practice provider that is assigned to the patient when roomed.

## 2022-12-06 ENCOUNTER — TELEPHONE (OUTPATIENT)
Dept: ORTHOPEDICS | Facility: CLINIC | Age: 57
End: 2022-12-06
Payer: COMMERCIAL

## 2022-12-06 NOTE — TELEPHONE ENCOUNTER
Patient has been scheduled with Dr Molina      FW: Referral To Ortho  Received: Yesterday  DAXA Nye MA  Needs ER f/u         Previous Messages     ----- Message -----   From: Kamini Kraus   Sent: 12/5/2022  10:21 AM CST   To: [facility] Clinical Staff   Subject: Referral To Ortho                                 Pt had a fall while hanging [employer] lights. Unable to move forward with scheduling.  Please contact patient at [phone] to assist with scheduling

## 2022-12-07 ENCOUNTER — OFFICE VISIT (OUTPATIENT)
Dept: ORTHOPEDICS | Facility: CLINIC | Age: 57
End: 2022-12-07
Payer: COMMERCIAL

## 2022-12-07 VITALS — WEIGHT: 221.31 LBS | BODY MASS INDEX: 28.4 KG/M2 | HEIGHT: 74 IN

## 2022-12-07 DIAGNOSIS — S46.311A RUPTURE OF RIGHT TRICEPS TENDON, INITIAL ENCOUNTER: Primary | ICD-10-CM

## 2022-12-07 DIAGNOSIS — M77.12 LEFT LATERAL EPICONDYLITIS: ICD-10-CM

## 2022-12-07 DIAGNOSIS — S50.02XA CONTUSION OF LEFT ELBOW, INITIAL ENCOUNTER: ICD-10-CM

## 2022-12-07 DIAGNOSIS — S50.02XA CONTUSION OF LEFT ELBOW, INITIAL ENCOUNTER: Primary | ICD-10-CM

## 2022-12-07 PROCEDURE — 3044F PR MOST RECENT HEMOGLOBIN A1C LEVEL <7.0%: ICD-10-PCS | Mod: CPTII,S$GLB,, | Performed by: ORTHOPAEDIC SURGERY

## 2022-12-07 PROCEDURE — 99999 PR PBB SHADOW E&M-EST. PATIENT-LVL III: ICD-10-PCS | Mod: PBBFAC,,, | Performed by: ORTHOPAEDIC SURGERY

## 2022-12-07 PROCEDURE — 3008F PR BODY MASS INDEX (BMI) DOCUMENTED: ICD-10-PCS | Mod: CPTII,S$GLB,, | Performed by: ORTHOPAEDIC SURGERY

## 2022-12-07 PROCEDURE — 99203 PR OFFICE/OUTPT VISIT, NEW, LEVL III, 30-44 MIN: ICD-10-PCS | Mod: S$GLB,,, | Performed by: ORTHOPAEDIC SURGERY

## 2022-12-07 PROCEDURE — 99203 OFFICE O/P NEW LOW 30 MIN: CPT | Mod: S$GLB,,, | Performed by: ORTHOPAEDIC SURGERY

## 2022-12-07 PROCEDURE — 1160F RVW MEDS BY RX/DR IN RCRD: CPT | Mod: CPTII,S$GLB,, | Performed by: ORTHOPAEDIC SURGERY

## 2022-12-07 PROCEDURE — 3008F BODY MASS INDEX DOCD: CPT | Mod: CPTII,S$GLB,, | Performed by: ORTHOPAEDIC SURGERY

## 2022-12-07 PROCEDURE — 1159F PR MEDICATION LIST DOCUMENTED IN MEDICAL RECORD: ICD-10-PCS | Mod: CPTII,S$GLB,, | Performed by: ORTHOPAEDIC SURGERY

## 2022-12-07 PROCEDURE — 1160F PR REVIEW ALL MEDS BY PRESCRIBER/CLIN PHARMACIST DOCUMENTED: ICD-10-PCS | Mod: CPTII,S$GLB,, | Performed by: ORTHOPAEDIC SURGERY

## 2022-12-07 PROCEDURE — 99999 PR PBB SHADOW E&M-EST. PATIENT-LVL III: CPT | Mod: PBBFAC,,, | Performed by: ORTHOPAEDIC SURGERY

## 2022-12-07 PROCEDURE — 1159F MED LIST DOCD IN RCRD: CPT | Mod: CPTII,S$GLB,, | Performed by: ORTHOPAEDIC SURGERY

## 2022-12-07 PROCEDURE — 3044F HG A1C LEVEL LT 7.0%: CPT | Mod: CPTII,S$GLB,, | Performed by: ORTHOPAEDIC SURGERY

## 2022-12-07 NOTE — PROGRESS NOTES
Subjective:     Patient ID: Jean-Paul Reeves is a 57 y.o. male.    Chief Complaint: Injury of the Left Elbow      HPI:  The patient is a 57-year-old male who fell about 4 ft from a ladder 12/03/2022.  He had both elbows.  The right elbow has recovered completely the left elbow still has pain particularly about the triceps tendon insertion.  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     Pulmonary embolism     Tachycardia      Past Surgical History:   Procedure Laterality Date    APPENDECTOMY      HERNIA REPAIR      Inguinal     Family History   Problem Relation Age of Onset    Cancer Mother         uncertain     Social History     Socioeconomic History    Marital status:    Tobacco Use    Smoking status: Never    Smokeless tobacco: Never   Substance and Sexual Activity    Alcohol use: Yes     Comment: occasionally, stop alcohol as of today    Drug use: No     Medication List with Changes/Refills   Current Medications    HYDROCODONE-ACETAMINOPHEN (NORCO)  MG PER TABLET    Take 1 tablet by mouth every 4 (four) hours as needed for Pain.    RIVAROXABAN (XARELTO) 20 MG TAB    Take 1 tablet (20 mg total) by mouth once daily.     Review of patient's allergies indicates:  No Known Allergies  Review of Systems   Constitutional: Negative for malaise/fatigue.   HENT:  Negative for hearing loss.    Eyes:  Negative for double vision and visual disturbance.   Cardiovascular:  Negative for chest pain.   Respiratory:  Positive for wheezing. Negative for shortness of breath.    Endocrine: Negative for cold intolerance.   Hematologic/Lymphatic: Does not bruise/bleed easily.   Skin:  Negative for poor wound healing and suspicious lesions.   Musculoskeletal:  Negative for gout, joint pain and joint swelling.   Gastrointestinal:  Negative for nausea and vomiting.   Genitourinary:  Negative for  dysuria.   Neurological:  Negative for numbness, paresthesias and sensory change.   Psychiatric/Behavioral:  Negative for depression, memory loss and substance abuse. The patient is not nervous/anxious.    Allergic/Immunologic: Negative for persistent infections.     Objective:   Body mass index is 28.42 kg/m².  There were no vitals filed for this visit.             General    Constitutional: He is oriented to person, place, and time. He appears well-developed and well-nourished. No distress.   HENT:   Head: Normocephalic.   Eyes: EOM are normal.   Pulmonary/Chest: Effort normal.   Neurological: He is oriented to person, place, and time.   Psychiatric: He has a normal mood and affect.         Left Hand/Wrist Exam     Other     Sensory Exam  Median Distribution: normal  Ulnar Distribution: normal      Left Elbow Exam     Inspection   Scars: absent  Effusion: present    Pain   The patient exhibits pain of the triceps insertion    Other   Sensation: normal    Comments:  The patient has tenderness well localized to the triceps insertion.  There is no step-off of the triceps tendon        Vascular Exam       Capillary Refill  Left Hand: normal capillary refill        Relevant imaging results reviewed and interpreted by me, discussed with the patient and / or family today radiographs left elbow showed a fracture of an olecranon osteophyte  Assessment:     Encounter Diagnosis   Name Primary?    Contusion of left elbow, initial encounter         Plan:     The patient was given an elbow brace locked in 90° flexion.  He was sent for an MRI scan for further documentation to rule out partial or complete triceps tendon rupture.  He will follow-up in the clinic after the MRI and if he does not have a triceps tendon tear the brace can be used p.r.n..                Disclaimer: This note was prepared using a voice recognition system and is likely to have sound alike errors within the text.

## 2022-12-20 ENCOUNTER — TELEPHONE (OUTPATIENT)
Dept: ORTHOPEDICS | Facility: CLINIC | Age: 57
End: 2022-12-20
Payer: COMMERCIAL

## 2022-12-20 ENCOUNTER — HOSPITAL ENCOUNTER (OUTPATIENT)
Dept: RADIOLOGY | Facility: HOSPITAL | Age: 57
Discharge: HOME | End: 2022-12-20
Attending: ORTHOPAEDIC SURGERY
Payer: COMMERCIAL

## 2022-12-20 DIAGNOSIS — S50.02XA CONTUSION OF LEFT ELBOW, INITIAL ENCOUNTER: ICD-10-CM

## 2022-12-20 DIAGNOSIS — M77.12 LEFT LATERAL EPICONDYLITIS: ICD-10-CM

## 2022-12-20 PROCEDURE — 73221 MRI JOINT UPR EXTREM W/O DYE: CPT | Mod: TC,LT

## 2022-12-20 NOTE — TELEPHONE ENCOUNTER
Spoke to the patient was able to get him scheduled for his MRI follow up also placed this patient on the wait list patient verbalized understanding.        ----- Message from Fabiola Naranjo sent at 12/20/2022  9:06 AM CST -----  Contact: [phone]  Pt is calling stating that he completed MRI . Please call back at [phone] . Thanks/dj

## 2023-01-18 ENCOUNTER — OFFICE VISIT (OUTPATIENT)
Dept: ORTHOPEDICS | Facility: CLINIC | Age: 58
End: 2023-01-18
Payer: COMMERCIAL

## 2023-01-18 VITALS — WEIGHT: 221 LBS | BODY MASS INDEX: 28.36 KG/M2 | HEIGHT: 74 IN

## 2023-01-18 DIAGNOSIS — S52.125D CLOSED NONDISPLACED FRACTURE OF HEAD OF LEFT RADIUS WITH ROUTINE HEALING, SUBSEQUENT ENCOUNTER: Primary | ICD-10-CM

## 2023-01-18 PROCEDURE — 99213 OFFICE O/P EST LOW 20 MIN: CPT | Mod: S$GLB,,, | Performed by: ORTHOPAEDIC SURGERY

## 2023-01-18 PROCEDURE — 99213 PR OFFICE/OUTPT VISIT, EST, LEVL III, 20-29 MIN: ICD-10-PCS | Mod: S$GLB,,, | Performed by: ORTHOPAEDIC SURGERY

## 2023-01-18 PROCEDURE — 99999 PR PBB SHADOW E&M-EST. PATIENT-LVL II: CPT | Mod: PBBFAC,,, | Performed by: ORTHOPAEDIC SURGERY

## 2023-01-18 PROCEDURE — 1159F PR MEDICATION LIST DOCUMENTED IN MEDICAL RECORD: ICD-10-PCS | Mod: CPTII,S$GLB,, | Performed by: ORTHOPAEDIC SURGERY

## 2023-01-18 PROCEDURE — 3008F BODY MASS INDEX DOCD: CPT | Mod: CPTII,S$GLB,, | Performed by: ORTHOPAEDIC SURGERY

## 2023-01-18 PROCEDURE — 1160F PR REVIEW ALL MEDS BY PRESCRIBER/CLIN PHARMACIST DOCUMENTED: ICD-10-PCS | Mod: CPTII,S$GLB,, | Performed by: ORTHOPAEDIC SURGERY

## 2023-01-18 PROCEDURE — 1160F RVW MEDS BY RX/DR IN RCRD: CPT | Mod: CPTII,S$GLB,, | Performed by: ORTHOPAEDIC SURGERY

## 2023-01-18 PROCEDURE — 99999 PR PBB SHADOW E&M-EST. PATIENT-LVL II: ICD-10-PCS | Mod: PBBFAC,,, | Performed by: ORTHOPAEDIC SURGERY

## 2023-01-18 PROCEDURE — 1159F MED LIST DOCD IN RCRD: CPT | Mod: CPTII,S$GLB,, | Performed by: ORTHOPAEDIC SURGERY

## 2023-01-18 PROCEDURE — 3008F PR BODY MASS INDEX (BMI) DOCUMENTED: ICD-10-PCS | Mod: CPTII,S$GLB,, | Performed by: ORTHOPAEDIC SURGERY

## 2023-01-18 NOTE — PROGRESS NOTES
Subjective:     Patient ID: Jean-Paul Reeves is a 57 y.o. male.    Chief Complaint: Pain and Injury of the Left Elbow      HPI:  The patient is a 57-year-old male status post left elbow injury after a fall in December 2022.  Initial radiographs were normal.  He did have a olecranon osteophyte fracture that is symptomatic but he had MRI scan left elbow that did show a nondisplaced radial head fracture.  Currently he is doing well and has no residual symptoms    Past Medical History:   Diagnosis Date    Anticoagulant long-term use     started 5-6 years ago Xarelto    Blood clot in vein     Heterozygous for prothrombin [id number] mutation     Pulmonary embolism     Tachycardia      Past Surgical History:   Procedure Laterality Date    APPENDECTOMY      HERNIA REPAIR      Inguinal     Family History   Problem Relation Age of Onset    Cancer Mother         uncertain     Social History     Socioeconomic History    Marital status:    Tobacco Use    Smoking status: Never    Smokeless tobacco: Never   Substance and Sexual Activity    Alcohol use: Yes     Comment: occasionally, stop alcohol as of today    Drug use: No     Medication List with Changes/Refills   Current Medications    HYDROCODONE-ACETAMINOPHEN (NORCO)  MG PER TABLET    Take 1 tablet by mouth every 4 (four) hours as needed for Pain.    RIVAROXABAN (XARELTO) 20 MG TAB    Take 1 tablet (20 mg total) by mouth once daily.     Review of patient's allergies indicates:  No Known Allergies  Review of Systems   Constitutional: Negative for malaise/fatigue.   HENT:  Negative for hearing loss.    Eyes:  Negative for double vision and visual disturbance.   Cardiovascular:  Positive for irregular heartbeat. Negative for chest pain.   Respiratory:  Positive for wheezing. Negative for shortness of breath.    Endocrine: Negative for cold intolerance.   Hematologic/Lymphatic: Does not bruise/bleed easily.   Skin:  Negative for poor wound healing and suspicious lesions.    Musculoskeletal:  Negative for gout, joint pain and joint swelling.   Gastrointestinal:  Negative for nausea and vomiting.   Genitourinary:  Negative for dysuria.   Neurological:  Negative for numbness, paresthesias and sensory change.   Psychiatric/Behavioral:  Negative for depression, memory loss and substance abuse. The patient is not nervous/anxious.    Allergic/Immunologic: Negative for persistent infections.     Objective:   Body mass index is 28.37 kg/m².  There were no vitals filed for this visit.             General    Constitutional: He is oriented to person, place, and time. He appears well-developed and well-nourished. No distress.   HENT:   Head: Normocephalic.   Eyes: EOM are normal.   Pulmonary/Chest: Effort normal.   Neurological: He is oriented to person, place, and time.   Psychiatric: He has a normal mood and affect.         Left Hand/Wrist Exam     Other     Sensory Exam  Median Distribution: normal  Ulnar Distribution: normal  Radial Distribution: normal      Left Elbow Exam     Inspection   Scars: absent  Effusion: absent    Pain   The patient exhibits pain of the olecranon    Other   Sensation: normal    Comments:  The patient has full range of motion left elbow.  He does have an olecranon osteophyte that is minimally symptomatic.  His radial head fracture is asymptomatic.        Vascular Exam       Capillary Refill  Left Hand: normal capillary refill        Relevant imaging results reviewed and interpreted by me, discussed with the patient and / or family today radiographs left elbow were normal other than olecranon osteophyte fracture.  MRI scan showed a nondisplaced radial head fracture.  Assessment:     Encounter Diagnosis   Name Primary?    Closed nondisplaced fracture of head of left radius with routine healing, subsequent encounter Yes        Plan:       The patient seems to be doing well will return on a as needed basis.              Disclaimer: This note was prepared using a voice  recognition system and is likely to have sound alike errors within the text.

## 2023-04-28 DIAGNOSIS — I82.4Y2 ACUTE DEEP VEIN THROMBOSIS (DVT) OF PROXIMAL VEIN OF LEFT LOWER EXTREMITY: ICD-10-CM

## 2023-11-07 ENCOUNTER — OFFICE VISIT (OUTPATIENT)
Dept: HEMATOLOGY/ONCOLOGY | Facility: CLINIC | Age: 58
End: 2023-11-07
Payer: COMMERCIAL

## 2023-11-07 VITALS
TEMPERATURE: 98 F | DIASTOLIC BLOOD PRESSURE: 86 MMHG | HEIGHT: 74 IN | SYSTOLIC BLOOD PRESSURE: 132 MMHG | BODY MASS INDEX: 29.77 KG/M2 | HEART RATE: 79 BPM | WEIGHT: 231.94 LBS | OXYGEN SATURATION: 97 %

## 2023-11-07 DIAGNOSIS — Z86.711 HISTORY OF PULMONARY EMBOLISM: ICD-10-CM

## 2023-11-07 DIAGNOSIS — D68.59 PRIMARY HYPERCOAGULABLE STATE: ICD-10-CM

## 2023-11-07 DIAGNOSIS — Z86.718 HISTORY OF DVT (DEEP VEIN THROMBOSIS): ICD-10-CM

## 2023-11-07 DIAGNOSIS — D68.52 HETEROZYGOUS FOR PROTHROMBIN G20210A MUTATION: Primary | ICD-10-CM

## 2023-11-07 PROCEDURE — 1159F PR MEDICATION LIST DOCUMENTED IN MEDICAL RECORD: ICD-10-PCS | Mod: CPTII,S$GLB,,

## 2023-11-07 PROCEDURE — 1159F MED LIST DOCD IN RCRD: CPT | Mod: CPTII,S$GLB,,

## 2023-11-07 PROCEDURE — 99999 PR PBB SHADOW E&M-EST. PATIENT-LVL III: ICD-10-PCS | Mod: PBBFAC,,,

## 2023-11-07 PROCEDURE — 3075F PR MOST RECENT SYSTOLIC BLOOD PRESS GE 130-139MM HG: ICD-10-PCS | Mod: CPTII,S$GLB,,

## 2023-11-07 PROCEDURE — 99214 PR OFFICE/OUTPT VISIT, EST, LEVL IV, 30-39 MIN: ICD-10-PCS | Mod: S$GLB,,,

## 2023-11-07 PROCEDURE — 3079F DIAST BP 80-89 MM HG: CPT | Mod: CPTII,S$GLB,,

## 2023-11-07 PROCEDURE — 3008F PR BODY MASS INDEX (BMI) DOCUMENTED: ICD-10-PCS | Mod: CPTII,S$GLB,,

## 2023-11-07 PROCEDURE — 99214 OFFICE O/P EST MOD 30 MIN: CPT | Mod: S$GLB,,,

## 2023-11-07 PROCEDURE — 3008F BODY MASS INDEX DOCD: CPT | Mod: CPTII,S$GLB,,

## 2023-11-07 PROCEDURE — 99999 PR PBB SHADOW E&M-EST. PATIENT-LVL III: CPT | Mod: PBBFAC,,,

## 2023-11-07 PROCEDURE — 3075F SYST BP GE 130 - 139MM HG: CPT | Mod: CPTII,S$GLB,,

## 2023-11-07 PROCEDURE — 3079F PR MOST RECENT DIASTOLIC BLOOD PRESSURE 80-89 MM HG: ICD-10-PCS | Mod: CPTII,S$GLB,,

## 2023-11-07 NOTE — PROGRESS NOTES
Subjective:       Patient ID: Jean-Paul Reeves is a 58 y.o. male.    Chief Complaint: Anticoagulation (/)    HPI: Mr. Reeves is a 58 year old male who is following up for his DVT/PE/heterozygote prothrombin gene mutation. He is currently on xarelto 20mg daily.   Clot Hx: He had 1st thrombosis in September 2011 with acute left lower extremity DVT located in the posterior tibial, popliteal and femoral veins and bilateral pulmonary embolism.     Today: Last CTA was 7/2022 without evidence of PE. Last U/s extrem was 12/2020 without evidence of DVT.  He been taking his Xarelto 20 mg daily, no missed doses.  He states he is had a little bit more swelling and a little bit more pain in his left leg. He states he is on his feet for over 10 hours a day and that's not something that can change. He uses compression socks, but doesn't always help. He doesn't need anything for the pain.     Social History     Socioeconomic History    Marital status:    Tobacco Use    Smoking status: Never    Smokeless tobacco: Never   Substance and Sexual Activity    Alcohol use: Yes     Comment: occasionally, stop alcohol as of today    Drug use: No       Past Medical History:   Diagnosis Date    Anticoagulant long-term use     started 5-6 years ago Xarelto    Blood clot in vein     Heterozygous for prothrombin [id number] mutation     Pulmonary embolism     Tachycardia        Family History   Problem Relation Age of Onset    Cancer Mother         uncertain       Past Surgical History:   Procedure Laterality Date    APPENDECTOMY      HERNIA REPAIR      Inguinal       Review of Systems   Constitutional:  Negative for activity change, appetite change, chills, diaphoresis, fatigue, fever and unexpected weight change.   HENT:  Negative for congestion, nosebleeds and rhinorrhea.    Respiratory:  Negative for cough and shortness of breath.    Cardiovascular:  Positive for leg swelling. Negative for chest pain.   Gastrointestinal:  Negative for  abdominal pain, anal bleeding, blood in stool, constipation, diarrhea, nausea and vomiting.   Genitourinary:  Negative for hematuria.   Musculoskeletal:  Positive for myalgias.   Skin:  Negative for color change and pallor.         Medication List with Changes/Refills   Current Medications    RIVAROXABAN (XARELTO) 20 MG TAB    Take 1 tablet (20 mg total) by mouth once daily.     Objective:     Vitals:    11/07/23 1558   BP: 132/86   Pulse: 79   Temp: 97.5 °F (36.4 °C)       Physical Exam  Vitals reviewed.   Constitutional:       General: He is not in acute distress.     Appearance: He is not ill-appearing, toxic-appearing or diaphoretic.   HENT:      Head: Normocephalic and atraumatic.   Cardiovascular:      Rate and Rhythm: Normal rate.   Musculoskeletal:      Right lower leg: No edema.      Left lower leg: No edema.   Skin:     General: Skin is warm.      Coloration: Skin is not jaundiced or pale.      Findings: No bruising or erythema.   Neurological:      Mental Status: He is alert.      Motor: No weakness.      Gait: Gait normal.   Psychiatric:         Mood and Affect: Mood normal.         Behavior: Behavior normal.         Thought Content: Thought content normal.            Labs/Results:    CTA 7/2022  Impression:  No evidence of pulmonary embolism    U/S extrem 12/2020  Impression:  No evidence of pulmonary embolism    Assessment:     Problem List Items Addressed This Visit          Hematology    History of pulmonary embolism    Primary hypercoagulable state    History of DVT (deep vein thrombosis)    Heterozygous for prothrombin [id number] mutation - Primary     Plan:     Heterozygous for prothrombin [id number] mutation, History of DVT (deep vein thrombosis), History of pulmonary embolism  --On chronic anticoagulation with rivaroxaban 20 mg daily taking as prescribed   --without missed doses.     --Given significant unprovoked DVT/ PE recommendation for indefinite anticoagulation.    --bleeding precautions given.     --He does understand if procedure will need to hold briefly prior to procedure and resuming afterwards.   --continue with compression socks.  --recommended to elevate legs when possible  --possibly see vascular surgery if needed     --overdue for colonoscopy  --UTD with PSA     Follow-Up:   obtain u/s. 1 year     Angela Cisneros PA-C  Hematology Oncology    Route Chart for Scheduling    Med Onc Chart Routing      Follow up with physician    Follow up with [facility] 1 year.   Infusion scheduling note    Injection scheduling note    Labs    Imaging   Schedule u/s bilateral legs   Pharmacy appointment    Other referrals

## 2024-01-10 ENCOUNTER — PATIENT OUTREACH (OUTPATIENT)
Dept: ADMINISTRATIVE | Facility: HOSPITAL | Age: 59
End: 2024-01-10
Payer: COMMERCIAL

## 2024-01-10 NOTE — PROGRESS NOTES
Pt returned call to schedule annual. Declined to schedule appt at this time. Said he would need to check his schedule as he is working out of town some. Said he would call back to schedule.

## 2024-01-30 ENCOUNTER — HOSPITAL ENCOUNTER (EMERGENCY)
Facility: HOSPITAL | Age: 59
Discharge: HOME | End: 2024-01-30
Attending: EMERGENCY MEDICINE
Payer: COMMERCIAL

## 2024-01-30 VITALS
RESPIRATION RATE: 18 BRPM | WEIGHT: 229.38 LBS | TEMPERATURE: 98 F | BODY MASS INDEX: 29.45 KG/M2 | SYSTOLIC BLOOD PRESSURE: 146 MMHG | OXYGEN SATURATION: 99 % | DIASTOLIC BLOOD PRESSURE: 96 MMHG | HEART RATE: 81 BPM

## 2024-01-30 DIAGNOSIS — I82.431 ACUTE DEEP VEIN THROMBOSIS (DVT) OF POPLITEAL VEIN OF RIGHT LOWER EXTREMITY: Primary | ICD-10-CM

## 2024-01-30 DIAGNOSIS — M79.606 LEG PAIN: ICD-10-CM

## 2024-01-30 LAB
ALBUMIN SERPL BCP-MCNC: 3.9 G/DL (ref 3.5–5.2)
ALP SERPL-CCNC: 80 U/L (ref 55–135)
ALT SERPL W/O P-5'-P-CCNC: 15 U/L (ref 10–44)
ANION GAP SERPL CALC-SCNC: 6 MMOL/L (ref 8–16)
APTT PPP: 29.2 SEC (ref 21–32)
AST SERPL-CCNC: 15 U/L (ref 10–40)
BASOPHILS # BLD AUTO: 0.04 K/UL (ref 0–0.2)
BASOPHILS NFR BLD: 0.8 % (ref 0–1.9)
BILIRUB SERPL-MCNC: 0.5 MG/DL (ref 0.1–1)
BUN SERPL-MCNC: 13 MG/DL (ref 6–20)
CALCIUM SERPL-MCNC: 9.7 MG/DL (ref 8.7–10.5)
CHLORIDE SERPL-SCNC: 107 MMOL/L (ref 95–110)
CO2 SERPL-SCNC: 28 MMOL/L (ref 23–29)
CREAT SERPL-MCNC: 1.2 MG/DL (ref 0.5–1.4)
DIFFERENTIAL METHOD BLD: ABNORMAL
EOSINOPHIL # BLD AUTO: 0.3 K/UL (ref 0–0.5)
EOSINOPHIL NFR BLD: 5 % (ref 0–8)
ERYTHROCYTE [DISTWIDTH] IN BLOOD BY AUTOMATED COUNT: 13.9 % (ref 11.5–14.5)
EST. GFR  (NO RACE VARIABLE): >60 ML/MIN/1.73 M^2
GLUCOSE SERPL-MCNC: 100 MG/DL (ref 70–110)
HCT VFR BLD AUTO: 45.2 % (ref 40–54)
HGB BLD-MCNC: 14.8 G/DL (ref 14–18)
IMM GRANULOCYTES # BLD AUTO: 0.01 K/UL (ref 0–0.04)
IMM GRANULOCYTES NFR BLD AUTO: 0.2 % (ref 0–0.5)
INR PPP: 1.1 (ref 0.8–1.2)
LYMPHOCYTES # BLD AUTO: 2.5 K/UL (ref 1–4.8)
LYMPHOCYTES NFR BLD: 50.2 % (ref 18–48)
MCH RBC QN AUTO: 27.7 PG (ref 27–31)
MCHC RBC AUTO-ENTMCNC: 32.7 G/DL (ref 32–36)
MCV RBC AUTO: 85 FL (ref 82–98)
MONOCYTES # BLD AUTO: 0.3 K/UL (ref 0.3–1)
MONOCYTES NFR BLD: 5.8 % (ref 4–15)
NEUTROPHILS # BLD AUTO: 1.9 K/UL (ref 1.8–7.7)
NEUTROPHILS NFR BLD: 38 % (ref 38–73)
NRBC BLD-RTO: 0 /100 WBC
PLATELET # BLD AUTO: 241 K/UL (ref 150–450)
PMV BLD AUTO: 9.8 FL (ref 9.2–12.9)
POTASSIUM SERPL-SCNC: 4.5 MMOL/L (ref 3.5–5.1)
PROT SERPL-MCNC: 7.7 G/DL (ref 6–8.4)
PROTHROMBIN TIME: 11.6 SEC (ref 9–12.5)
RBC # BLD AUTO: 5.35 M/UL (ref 4.6–6.2)
SODIUM SERPL-SCNC: 141 MMOL/L (ref 136–145)
WBC # BLD AUTO: 4.96 K/UL (ref 3.9–12.7)

## 2024-01-30 PROCEDURE — 85610 PROTHROMBIN TIME: CPT | Performed by: NURSE PRACTITIONER

## 2024-01-30 PROCEDURE — 99284 EMERGENCY DEPT VISIT MOD MDM: CPT | Mod: 25

## 2024-01-30 PROCEDURE — 85025 COMPLETE CBC W/AUTO DIFF WBC: CPT | Performed by: NURSE PRACTITIONER

## 2024-01-30 PROCEDURE — 85730 THROMBOPLASTIN TIME PARTIAL: CPT | Performed by: NURSE PRACTITIONER

## 2024-01-30 PROCEDURE — 25000003 PHARM REV CODE 250: Performed by: NURSE PRACTITIONER

## 2024-01-30 PROCEDURE — 80053 COMPREHEN METABOLIC PANEL: CPT | Performed by: NURSE PRACTITIONER

## 2024-01-30 RX ORDER — HYDROCODONE BITARTRATE AND ACETAMINOPHEN 5; 325 MG/1; MG/1
1 TABLET ORAL EVERY 6 HOURS PRN
Qty: 12 TABLET | Refills: 0 | Status: SHIPPED | OUTPATIENT
Start: 2024-01-30

## 2024-01-30 RX ADMIN — RIVAROXABAN 15 MG: 15 TABLET, FILM COATED ORAL at 01:01

## 2024-01-30 NOTE — DISCHARGE INSTRUCTIONS
Hold off on taking Xarelto 20 mg daily for the next 30 days ant take Xarelto 15 mg twice daily for the next 30 days and then resume taking Xarelto 20 mg daily.  Please do not skip any doses of Xarelto.

## 2024-01-30 NOTE — ED NOTES
Bed: [facility] 02  Expected date:   Expected time:   Means of arrival:   Comments:

## 2024-01-30 NOTE — FIRST PROVIDER EVALUATION
Medical screening examination initiated.  I have conducted a focused provider triage encounter, findings are as follows:    Brief history of present illness:  58 year old male with complaint of left leg pain and swelling over the past week.  History of DVT.     Vitals:    01/30/24 1117 01/30/24 1118   BP:  (!) 146/96   Pulse: 81    Resp: 18    Temp: 97.8 °F (36.6 °C)    TempSrc: Oral    SpO2: 99%    Weight: 104.1 kg (229 lb 6.2 oz)        Pertinent physical exam:  left calf tenderness    Brief workup plan: rule out DVT    Preliminary workup initiated; this workup will be continued and followed by the physician or advanced practice provider that is assigned to the patient when roomed.

## 2024-01-30 NOTE — ED PROVIDER NOTES
Encounter Date: 1/30/2024       History     Chief Complaint   Patient presents with    Leg Pain     Reports LLE swollen and painful. Pain most intense behind knee. Hx of DVT currently on Xarelto     58-year-old male with complaint of left lower leg pain and swelling over the past several days.  Patient denies chest pain.  Patient denies shortness of breath.  Patient currently takes Xarelto for previous DVT.  Patient takes 20 mg daily.        Review of patient's allergies indicates:  No Known Allergies  Past Medical History:   Diagnosis Date    Anticoagulant long-term use     started 5-6 years ago Xarelto    Blood clot in vein     Heterozygous for prothrombin [id number] mutation     Pulmonary embolism     Tachycardia      Past Surgical History:   Procedure Laterality Date    APPENDECTOMY      HERNIA REPAIR      Inguinal     Family History   Problem Relation Age of Onset    Cancer Mother         uncertain     Social History     Tobacco Use    Smoking status: Never    Smokeless tobacco: Never   Substance Use Topics    Alcohol use: Yes     Comment: occasionally, stop alcohol as of today    Drug use: No     Review of Systems   Constitutional:  Negative for fever.   HENT:  Negative for sore throat.    Respiratory:  Negative for shortness of breath.    Cardiovascular:  Negative for chest pain.   Gastrointestinal:  Negative for nausea.   Genitourinary:  Negative for dysuria.   Musculoskeletal:  Negative for back pain.        Left leg pain    Skin:  Negative for rash.   Neurological:  Negative for weakness.   Hematological:  Does not bruise/bleed easily.       Physical Exam     Initial Vitals   BP Pulse Resp Temp SpO2   01/30/24 1118 01/30/24 1117 01/30/24 1117 01/30/24 1117 01/30/24 1117   (!) 146/96 81 18 97.8 °F (36.6 °C) 99 %      MAP       --                Physical Exam    Nursing note and vitals reviewed.  Constitutional: He appears well-developed and well-nourished.   HENT:   Head: Normocephalic and atraumatic.   Eyes:  Conjunctivae are normal. Pupils are equal, round, and reactive to light.   Neck: Neck supple.   Normal range of motion.  Cardiovascular:  Normal rate, regular rhythm, normal heart sounds and intact distal pulses.           Pulmonary/Chest: Breath sounds normal.   Abdominal: Abdomen is soft. There is no rebound and no guarding.   Musculoskeletal:         General: Normal range of motion.      Cervical back: Normal range of motion and neck supple.      Comments: Mild left lower leg swelling with calf tenderness     Neurological: He is alert.   Skin: Skin is warm and dry.   Psychiatric: He has a normal mood and affect. His behavior is normal. Thought content normal.         ED Course   Procedures  Labs Reviewed   CBC W/ AUTO DIFFERENTIAL - Abnormal; Notable for the following components:       Result Value    Lymph % 50.2 (*)     All other components within normal limits   COMPREHENSIVE METABOLIC PANEL - Abnormal; Notable for the following components:    Anion Gap 6 (*)     All other components within normal limits   PROTIME-INR   APTT        Labs Reviewed   CBC W/ AUTO DIFFERENTIAL - Abnormal; Notable for the following components:       Result Value    Lymph % 50.2 (*)     All other components within normal limits   COMPREHENSIVE METABOLIC PANEL - Abnormal; Notable for the following components:    Anion Gap 6 (*)     All other components within normal limits   PROTIME-INR   APTT         Imaging Results              US Lower Extremity Veins Left (Final result)  Result time 01/30/24 12:11:24      Final result by Israel Cleveland MD (01/30/24 12:11:24)                   Impression:      Acute DVT seen within the distal left femoral vein and left popliteal vein.  Recommend trial of anticoagulation with follow-up ultrasound and IR consultation in 1 week to determine the need for additional intervention.  If clot appears to be resolved or improving in 1 week then no intervention however if there is provocation, intervention  could be performed at that time.      Electronically signed by: Israel Cleveland MD  Date:    01/30/2024  Time:    12:11               Narrative:    EXAMINATION:  US LOWER EXTREMITY VEINS LEFT    CLINICAL HISTORY:  Pain in leg, unspecified    TECHNIQUE:  Duplex and color flow Doppler evaluation and graded compression of the left lower extremity veins was performed.    COMPARISON:  12/20/2020    FINDINGS:  Left thigh veins: Left common femoral vein is patent and compressible.  Acute DVT seen within the distal left femoral vein and left popliteal vein.    Left calf veins: The visualized calf veins are patent.    Contralateral CFV: The contralateral (right) common femoral vein is patent and free of thrombus.    Miscellaneous: None                                       Medications   rivaroxaban tablet 15 mg (15 mg Oral Given 1/30/24 1323)     Medical Decision Making  12:49 PM  Case discussed with Dr. Infante.  Patient admitted to skipping a few doses on accident before pain and swelling began.  Dr. Infante recommended to restart patient on 15 mg Xarelto twice per day for 1 month and then resume to 20 mg daily.  Case also discussed with Dr. Lozano with IR.  Did not recommend any intervention at present.    No chest pain. No SOB.      Amount and/or Complexity of Data Reviewed  Labs: ordered.    Risk  Prescription drug management.                                      Clinical Impression:  Final diagnoses:  [M79.606] Leg pain  [I82.431] Acute deep vein thrombosis (DVT) of popliteal vein of right lower extremity (Primary)          ED Disposition Condition    Discharge           ED Prescriptions       Medication Sig Dispense Start Date End Date Auth. Provider    rivaroxaban (XARELTO) 15 mg Tab Take 1 tablet (15 mg total) by mouth 2 (two) times daily with meals. 60 tablet 1/30/2024 2/29/2024 Herminio Carver, NP    HYDROcodone-acetaminophen (NORCO) 5-325 mg per tablet Take 1 tablet by mouth every 6 (six) hours as needed for Pain.  12 tablet 1/30/2024 -- Herminio Carver NP          Follow-up Information       Follow up With Specialties Details Why Contact Info    [facility]  Schedule an appointment as soon as possible for a visit in 2 days  [phone]             Herminio Carver, ADRIA  01/30/24 0454

## 2024-02-09 ENCOUNTER — DOCUMENTATION ONLY (OUTPATIENT)
Dept: HEMATOLOGY/ONCOLOGY | Facility: CLINIC | Age: 59
End: 2024-02-09

## 2024-02-09 ENCOUNTER — OFFICE VISIT (OUTPATIENT)
Dept: HEMATOLOGY/ONCOLOGY | Facility: CLINIC | Age: 59
End: 2024-02-09
Payer: COMMERCIAL

## 2024-02-09 VITALS
BODY MASS INDEX: 29.36 KG/M2 | HEART RATE: 89 BPM | OXYGEN SATURATION: 96 % | DIASTOLIC BLOOD PRESSURE: 79 MMHG | HEIGHT: 74 IN | WEIGHT: 228.75 LBS | TEMPERATURE: 98 F | SYSTOLIC BLOOD PRESSURE: 121 MMHG

## 2024-02-09 DIAGNOSIS — D68.59 PRIMARY HYPERCOAGULABLE STATE: ICD-10-CM

## 2024-02-09 DIAGNOSIS — Z86.711 HISTORY OF PULMONARY EMBOLISM: ICD-10-CM

## 2024-02-09 DIAGNOSIS — Z86.718 HISTORY OF DVT (DEEP VEIN THROMBOSIS): Primary | ICD-10-CM

## 2024-02-09 DIAGNOSIS — D68.52 HETEROZYGOUS FOR PROTHROMBIN G20210A MUTATION: ICD-10-CM

## 2024-02-09 DIAGNOSIS — I87.009 POST-PHLEBITIC SYNDROME: ICD-10-CM

## 2024-02-09 PROCEDURE — 1159F MED LIST DOCD IN RCRD: CPT | Mod: CPTII,S$GLB,, | Performed by: INTERNAL MEDICINE

## 2024-02-09 PROCEDURE — 3078F DIAST BP <80 MM HG: CPT | Mod: CPTII,S$GLB,, | Performed by: INTERNAL MEDICINE

## 2024-02-09 PROCEDURE — 3074F SYST BP LT 130 MM HG: CPT | Mod: CPTII,S$GLB,, | Performed by: INTERNAL MEDICINE

## 2024-02-09 PROCEDURE — 3008F BODY MASS INDEX DOCD: CPT | Mod: CPTII,S$GLB,, | Performed by: INTERNAL MEDICINE

## 2024-02-09 PROCEDURE — 99214 OFFICE O/P EST MOD 30 MIN: CPT | Mod: S$GLB,,, | Performed by: INTERNAL MEDICINE

## 2024-02-09 PROCEDURE — 1160F RVW MEDS BY RX/DR IN RCRD: CPT | Mod: CPTII,S$GLB,, | Performed by: INTERNAL MEDICINE

## 2024-02-09 PROCEDURE — 99999 PR PBB SHADOW E&M-EST. PATIENT-LVL IV: CPT | Mod: PBBFAC,,, | Performed by: INTERNAL MEDICINE

## 2024-02-09 NOTE — PROGRESS NOTES
"Subjective:       Patient ID: Jean-Paul Reeves is a 58 y.o. male.    Chief Complaint: Results and Coagulation Disorder    HPI:  58-year-old male history of prothrombin gene complex.  Heterozygous state.  The patient had discontinued his Xarelto while on a trip for minimum of 3 days.  Patient now has DVT involving left leg.  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     Pulmonary embolism     Tachycardia      Family History   Problem Relation Age of Onset    Cancer Mother         uncertain     Social History     Socioeconomic History    Marital status:    Tobacco Use    Smoking status: Never    Smokeless tobacco: Never   Substance and Sexual Activity    Alcohol use: Yes     Comment: occasionally, stop alcohol as of today    Drug use: No     Past Surgical History:   Procedure Laterality Date    APPENDECTOMY      HERNIA REPAIR      Inguinal       Labs:  Lab Results   Component Value Date    WBC 4.96 01/30/2024    HGB 14.8 01/30/2024    HCT 45.2 01/30/2024    MCV 85 01/30/2024     01/30/2024     BMP  Lab Results   Component Value Date     01/30/2024    K 4.5 01/30/2024     01/30/2024    CO2 28 01/30/2024    BUN 13 01/30/2024    CREATININE 1.2 01/30/2024    CALCIUM 9.7 01/30/2024    ANIONGAP 6 (L) 01/30/2024    ESTGFRAFRICA >60 07/06/2022    EGFRNONAA 55 (A) 07/06/2022     Lab Results   Component Value Date    ALT 15 01/30/2024    AST 15 01/30/2024    ALKPHOS 80 01/30/2024    BILITOT 0.5 01/30/2024       No results found for: "IRON", "TIBC", "FERRITIN", "SATURATEDIRO"  No results found for: "TCUAXMIN50"  No results found for: "FOLATE"  Lab Results   Component Value Date    TSH 0.89 10/30/2011         Review of Systems   Constitutional:  Negative for activity change, appetite change, chills, " diaphoresis, fatigue, fever and unexpected weight change.   HENT:  Negative for congestion, dental problem, drooling, ear discharge, ear pain, facial swelling, hearing loss, mouth sores, nosebleeds, postnasal drip, rhinorrhea, sinus pressure, sneezing, sore throat, tinnitus, trouble swallowing and voice change.    Eyes:  Negative for photophobia, pain, discharge, redness, itching and visual disturbance.   Respiratory:  Negative for apnea, cough, choking, chest tightness, shortness of breath, wheezing and stridor.    Cardiovascular:  Negative for chest pain, palpitations and leg swelling.   Gastrointestinal:  Negative for abdominal distention, abdominal pain, anal bleeding, blood in stool, constipation, diarrhea, nausea, rectal pain and vomiting.   Endocrine: Negative for cold intolerance, heat intolerance, polydipsia, polyphagia and polyuria.   Genitourinary:  Negative for decreased urine volume, difficulty urinating, dysuria, enuresis, flank pain, frequency, genital sores, hematuria, penile discharge, penile pain, penile swelling, scrotal swelling, testicular pain and urgency.   Musculoskeletal:  Positive for arthralgias, gait problem and myalgias. Negative for back pain, joint swelling, neck pain and neck stiffness.   Skin:  Negative for color change, pallor, rash and wound.   Allergic/Immunologic: Negative for environmental allergies, food allergies and immunocompromised state.   Neurological:  Positive for weakness. Negative for dizziness, tremors, seizures, syncope, facial asymmetry, speech difficulty, light-headedness, numbness and headaches.   Hematological:  Negative for adenopathy. Does not bruise/bleed easily.   Psychiatric/Behavioral:  Negative for agitation, behavioral problems, confusion, decreased concentration, dysphoric mood, hallucinations, self-injury, sleep disturbance and suicidal ideas. The patient is not nervous/anxious and is not hyperactive.        Objective:      Physical Exam  Vitals  reviewed.   Constitutional:       General: He is not in acute distress.     Appearance: He is well-developed. He is not diaphoretic.   HENT:      Head: Normocephalic.      Right Ear: External ear normal.      Left Ear: External ear normal.      Nose: Nose normal.      Right Sinus: No maxillary sinus tenderness or frontal sinus tenderness.      Left Sinus: No maxillary sinus tenderness or frontal sinus tenderness.      Mouth/Throat:      Pharynx: No oropharyngeal exudate.   Eyes:      General: Lids are normal. No scleral icterus.        Right eye: No discharge.         Left eye: No discharge.      Extraocular Movements:      Right eye: Normal extraocular motion.      Left eye: Normal extraocular motion.      Conjunctiva/sclera:      Right eye: Right conjunctiva is not injected. No hemorrhage.     Left eye: Left conjunctiva is not injected. No hemorrhage.     Pupils: Pupils are equal, round, and reactive to light.   Neck:      Thyroid: No thyromegaly.      Vascular: No JVD.      Trachea: No tracheal deviation.   Cardiovascular:      Rate and Rhythm: Normal rate.   Pulmonary:      Effort: Pulmonary effort is normal. No respiratory distress.      Breath sounds: No stridor.   Abdominal:      General: Bowel sounds are normal.      Palpations: Abdomen is soft. There is no hepatomegaly, splenomegaly or mass.      Tenderness: There is no abdominal tenderness.   Musculoskeletal:         General: No tenderness. Normal range of motion.      Cervical back: Normal range of motion and neck supple.   Lymphadenopathy:      Head:      Right side of head: No posterior auricular or occipital adenopathy.      Left side of head: No posterior auricular or occipital adenopathy.      Cervical: No cervical adenopathy.      Right cervical: No superficial, deep or posterior cervical adenopathy.     Left cervical: No superficial, deep or posterior cervical adenopathy.      Upper Body:      Right upper body: No supraclavicular adenopathy.       Left upper body: No supraclavicular adenopathy.   Skin:     General: Skin is dry.      Findings: No erythema or rash.      Nails: There is no clubbing.   Neurological:      Mental Status: He is alert and oriented to person, place, and time.      Cranial Nerves: No cranial nerve deficit.      Motor: Weakness present.      Coordination: Coordination normal.      Gait: Gait abnormal.   Psychiatric:         Behavior: Behavior normal.         Thought Content: Thought content normal.         Judgment: Judgment normal.             Assessment:      1. History of DVT (deep vein thrombosis)    2. 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 History of pulmonary embolism    4. Post-phlebitic syndrome    5. Primary hypercoagulable state           Med Onc Chart Routing      Follow up with physician 4 weeks. Referral made to Interventional Radiology for thrombolysis.  Also referral made for  to see RTC to see me in approximately 6 weeks   Follow up with [facility]ON    Infusion scheduling note    Injection scheduling note    Labs    Imaging    Pharmacy appointment    Other referrals         Referral Interventional Radiology for potential thrombolysis and referral made to               Plan:     Spoke with Interventional Radiology personally for evaluation for thrombolysis spoke with  present for consideration work excuse as well as disability.  At this time would recommend reinstituting lifelong anticoagulation stress to them dosing.  Currently g twice a day for 7 days.  Restart Xarelto 20 mg daily.  Discussed implications of answered questions with him certainly take short course of ibuprofen for pain relief thrombolysis discomfort after speaking directly        Abdelrahman Lechuga Jr, MD FACP     Orthopedic

## 2024-02-09 NOTE — LETTER
February 9, 2024      [facility]  [address] [address]  Phone: [phone]  Fax: [phone]       Patient: Jean-Paul Reeves   YOB: 1965  Date of Visit: 02/09/2024    To Whom It May Concern:    Kamla Reeves  was at [facility] on 02/09/2024. The patient is excused from until 2/20/2024 with restrictions. Restrictions will be specified once medical workup is completed.  If you have any questions or concerns, or if I can be of further assistance, please do not hesitate to contact me.    Sincerely,    Abdelrahman Lechuga MD

## 2024-02-09 NOTE — PROGRESS NOTES
Balwinderr consulted to assist pt with work letter today. Balwindrer met with provider, pt and pt.'s spouse today in clinic. Pt needing work excuse through 2/20/24 while further workup is in process. Clinic staff assisted with sending letter request electronically for signature for pt to receive via [employer]. Pt also provided with telephone number to local  Office ([phone]) as well as online link: [email or web address] to apply for disability, as pt was interested in this. Brooke explained [facility] rep is no longer able to travel to  and pt was still interested in referral. Swer sent referral today. There were no other needs. Swer will remain available.    sister marie sahu - [phone]

## 2024-02-16 ENCOUNTER — TELEPHONE (OUTPATIENT)
Dept: HEMATOLOGY/ONCOLOGY | Facility: CLINIC | Age: 59
End: 2024-02-16
Payer: COMMERCIAL

## 2024-02-16 DIAGNOSIS — M79.605 PAIN OF LEFT LOWER EXTREMITY: Primary | ICD-10-CM

## 2024-02-16 NOTE — TELEPHONE ENCOUNTER
----- Message from García Espinal sent at 2/16/2024 10:32 AM CST -----  Contact: Jean-Paul  Patient is requesting a call back concerning blood clot in his leg. Please call pt back at soonest convenience [phone].           Thanks

## 2024-02-19 ENCOUNTER — HOSPITAL ENCOUNTER (OUTPATIENT)
Dept: RADIOLOGY | Facility: HOSPITAL | Age: 59
Discharge: HOME | End: 2024-02-19
Attending: RADIOLOGY
Payer: COMMERCIAL

## 2024-02-19 DIAGNOSIS — M79.605 PAIN OF LEFT LOWER EXTREMITY: ICD-10-CM

## 2024-02-19 PROCEDURE — 93971 EXTREMITY STUDY: CPT | Mod: TC,LT

## 2024-02-19 PROCEDURE — 93971 EXTREMITY STUDY: CPT | Mod: 26,LT,, | Performed by: RADIOLOGY

## 2024-02-23 ENCOUNTER — PATIENT MESSAGE (OUTPATIENT)
Dept: ADMINISTRATIVE | Facility: HOSPITAL | Age: 59
End: 2024-02-23
Payer: COMMERCIAL

## 2024-03-01 ENCOUNTER — PATIENT MESSAGE (OUTPATIENT)
Dept: ADMINISTRATIVE | Facility: HOSPITAL | Age: 59
End: 2024-03-01
Payer: COMMERCIAL

## 2024-04-20 DIAGNOSIS — I82.4Y2 ACUTE DEEP VEIN THROMBOSIS (DVT) OF PROXIMAL VEIN OF LEFT LOWER EXTREMITY: ICD-10-CM

## 2024-04-22 RX ORDER — RIVAROXABAN 20 MG/1
20 TABLET, FILM COATED ORAL
Qty: 90 TABLET | Refills: 3 | Status: SHIPPED | OUTPATIENT
Start: 2024-04-22

## 2024-07-12 ENCOUNTER — OFFICE VISIT (OUTPATIENT)
Dept: HEMATOLOGY/ONCOLOGY | Facility: CLINIC | Age: 59
End: 2024-07-12
Payer: COMMERCIAL

## 2024-07-12 VITALS
HEIGHT: 74 IN | DIASTOLIC BLOOD PRESSURE: 76 MMHG | BODY MASS INDEX: 30.12 KG/M2 | HEART RATE: 62 BPM | OXYGEN SATURATION: 96 % | WEIGHT: 234.69 LBS | TEMPERATURE: 98 F | SYSTOLIC BLOOD PRESSURE: 118 MMHG

## 2024-07-12 DIAGNOSIS — D68.59 PRIMARY HYPERCOAGULABLE STATE: ICD-10-CM

## 2024-07-12 DIAGNOSIS — D68.52 HETEROZYGOUS FOR PROTHROMBIN G20210A MUTATION: ICD-10-CM

## 2024-07-12 DIAGNOSIS — Z86.711 HISTORY OF PULMONARY EMBOLISM: ICD-10-CM

## 2024-07-12 DIAGNOSIS — Z86.718 HISTORY OF DVT (DEEP VEIN THROMBOSIS): ICD-10-CM

## 2024-07-12 DIAGNOSIS — I87.009 POST-PHLEBITIC SYNDROME: ICD-10-CM

## 2024-07-12 DIAGNOSIS — I82.4Y2 ACUTE DEEP VEIN THROMBOSIS (DVT) OF PROXIMAL VEIN OF LEFT LOWER EXTREMITY: Primary | ICD-10-CM

## 2024-07-12 PROCEDURE — 99999 PR PBB SHADOW E&M-EST. PATIENT-LVL V: CPT | Mod: PBBFAC,,, | Performed by: INTERNAL MEDICINE

## 2024-07-12 NOTE — PROGRESS NOTES
"Subjective:       Patient ID: Jean-Paul Reeves is a 59 y.o. male.    Chief Complaint: Results and Coagulation Disorder    HPI:  59-year-old male history of previous DVT left leg with pulmonary embolus patient has heterozygous prothrombin gene mutation variant patient is currently on Xarelto 20 mg per day patient has been working outside in reports increased swelling involving his left leg    Past Medical History:   Diagnosis Date    Anticoagulant long-term use     started 5-6 years ago Xarelto    Blood clot in vein     Heterozygous for prothrombin [id number] mutation     Pulmonary embolism     Tachycardia      Family History   Problem Relation Name Age of Onset    Cancer Mother          uncertain     Social History     Socioeconomic History    Marital status:    Tobacco Use    Smoking status: Never    Smokeless tobacco: Never   Substance and Sexual Activity    Alcohol use: Yes     Comment: occasionally, stop alcohol as of today    Drug use: No     Past Surgical History:   Procedure Laterality Date    APPENDECTOMY      HERNIA REPAIR      Inguinal       Labs:  Lab Results   Component Value Date    WBC 4.96 01/30/2024    HGB 14.8 01/30/2024    HCT 45.2 01/30/2024    MCV 85 01/30/2024     01/30/2024     BMP  Lab Results   Component Value Date     01/30/2024    K 4.5 01/30/2024     01/30/2024    CO2 28 01/30/2024    BUN 13 01/30/2024    CREATININE 1.2 01/30/2024    CALCIUM 9.7 01/30/2024    ANIONGAP 6 (L) 01/30/2024    ESTGFRAFRICA >60 07/06/2022    EGFRNONAA 55 (A) 07/06/2022     Lab Results   Component Value Date    ALT 15 01/30/2024    AST 15 01/30/2024    ALKPHOS 80 01/30/2024    BILITOT 0.5 01/30/2024       No results found for: "IRON", "TIBC", "FERRITIN", "SATURATEDIRO"  No results found for: "[id number]"  No results found for: "FOLATE"  Lab Results   Component Value Date    TSH 0.89 10/30/2011         Review of Systems   Constitutional:  Negative for activity change, appetite change, chills, " diaphoresis, fatigue, fever and unexpected weight change.   HENT:  Negative for congestion, dental problem, drooling, ear discharge, ear pain, facial swelling, hearing loss, mouth sores, nosebleeds, postnasal drip, rhinorrhea, sinus pressure, sneezing, sore throat, tinnitus, trouble swallowing and voice change.    Eyes:  Negative for photophobia, pain, discharge, redness, itching and visual disturbance.   Respiratory:  Negative for apnea, cough, choking, chest tightness, shortness of breath, wheezing and stridor.    Cardiovascular:  Positive for leg swelling. Negative for chest pain and palpitations.   Gastrointestinal:  Negative for abdominal distention, abdominal pain, anal bleeding, blood in stool, constipation, diarrhea, nausea, rectal pain and vomiting.   Endocrine: Negative for cold intolerance, heat intolerance, polydipsia, polyphagia and polyuria.   Genitourinary:  Negative for decreased urine volume, difficulty urinating, dysuria, enuresis, flank pain, frequency, genital sores, hematuria, penile discharge, penile pain, penile swelling, scrotal swelling, testicular pain and urgency.   Musculoskeletal:  Positive for gait problem. Negative for arthralgias, back pain, joint swelling, myalgias, neck pain and neck stiffness.   Skin:  Negative for color change, pallor, rash and wound.   Allergic/Immunologic: Negative for environmental allergies, food allergies and immunocompromised state.   Neurological:  Negative for dizziness, tremors, seizures, syncope, facial asymmetry, speech difficulty, weakness, light-headedness, numbness and headaches.   Hematological:  Negative for adenopathy. Does not bruise/bleed easily.   Psychiatric/Behavioral:  Positive for dysphoric mood. Negative for agitation, behavioral problems, confusion, decreased concentration, hallucinations, self-injury, sleep disturbance and suicidal ideas. The patient is nervous/anxious. The patient is not hyperactive.        Objective:      Physical  Exam  Vitals reviewed.   Constitutional:       General: He is not in acute distress.     Appearance: He is well-developed. He is not diaphoretic.   HENT:      Head: Normocephalic.      Right Ear: External ear normal.      Left Ear: External ear normal.      Nose: Nose normal.      Right Sinus: No maxillary sinus tenderness or frontal sinus tenderness.      Left Sinus: No maxillary sinus tenderness or frontal sinus tenderness.      Mouth/Throat:      Pharynx: No oropharyngeal exudate.   Eyes:      General: Lids are normal. No scleral icterus.        Right eye: No discharge.         Left eye: No discharge.      Extraocular Movements:      Right eye: Normal extraocular motion.      Left eye: Normal extraocular motion.      Conjunctiva/sclera:      Right eye: Right conjunctiva is not injected. No hemorrhage.     Left eye: Left conjunctiva is not injected. No hemorrhage.     Pupils: Pupils are equal, round, and reactive to light.   Neck:      Thyroid: No thyromegaly.      Vascular: No JVD.      Trachea: No tracheal deviation.   Cardiovascular:      Rate and Rhythm: Normal rate.   Pulmonary:      Effort: Pulmonary effort is normal. No respiratory distress.      Breath sounds: No stridor.   Abdominal:      General: Bowel sounds are normal.      Palpations: Abdomen is soft. There is no hepatomegaly, splenomegaly or mass.      Tenderness: There is no abdominal tenderness.   Musculoskeletal:         General: No tenderness. Normal range of motion.      Cervical back: Normal range of motion and neck supple.      Left lower leg: Edema present.   Lymphadenopathy:      Head:      Right side of head: No posterior auricular or occipital adenopathy.      Left side of head: No posterior auricular or occipital adenopathy.      Cervical: No cervical adenopathy.      Right cervical: No superficial, deep or posterior cervical adenopathy.     Left cervical: No superficial, deep or posterior cervical adenopathy.      Upper Body:      Right  upper body: No supraclavicular adenopathy.      Left upper body: No supraclavicular adenopathy.   Skin:     General: Skin is dry.      Findings: No erythema or rash.      Nails: There is no clubbing.   Neurological:      Mental Status: He is alert and oriented to person, place, and time.      Cranial Nerves: No cranial nerve deficit.      Coordination: Coordination normal.   Psychiatric:         Behavior: Behavior normal.         Thought Content: Thought content normal.         Judgment: Judgment normal.             Assessment:      1. Acute deep vein thrombosis (DVT) of proximal vein of left lower extremity    2. Heterozygous for prothrombin [id number] mutation    3. History of DVT (deep vein thrombosis)    4. History of pulmonary embolism    5. Post-phlebitic syndrome    6. Primary hypercoagulable state           Med Onc Chart Routing      Follow up with physician    Follow up with SHARRON    Infusion scheduling note    Injection scheduling note    Labs    Imaging   Ultrasound left lower extremity   Pharmacy appointment    Other referrals         Referral physical therapy for evaluation of lymphedema lower extremity post phlebitic state              Plan:     Continue with current dosing of Xarelto 20 mg per day.  Will check ultrasound to see if any new changes have developed otherwise referral to physical therapy for evaluation of lymphedema in lower extremity.  For possible support hose.  In lymphedema protocol if positive for DVT obviously will need to re-evaluate will probably start on low-molecular heparin twice a day while evaluating for long-term warfarin use        Abdelrahman Lechuga Jr, MD FACP

## 2024-07-16 ENCOUNTER — HOSPITAL ENCOUNTER (OUTPATIENT)
Dept: RADIOLOGY | Facility: HOSPITAL | Age: 59
Discharge: HOME | End: 2024-07-16
Attending: INTERNAL MEDICINE
Payer: COMMERCIAL

## 2024-07-16 DIAGNOSIS — I82.4Y2 ACUTE DEEP VEIN THROMBOSIS (DVT) OF PROXIMAL VEIN OF LEFT LOWER EXTREMITY: ICD-10-CM

## 2024-07-16 PROCEDURE — 93971 EXTREMITY STUDY: CPT | Mod: TC,LT

## 2024-07-16 PROCEDURE — 93971 EXTREMITY STUDY: CPT | Mod: 26,LT,, | Performed by: RADIOLOGY

## 2024-07-22 ENCOUNTER — CLINICAL SUPPORT (OUTPATIENT)
Dept: REHABILITATION | Facility: HOSPITAL | Age: 59
End: 2024-07-22
Attending: INTERNAL MEDICINE
Payer: COMMERCIAL

## 2024-07-22 DIAGNOSIS — I82.4Y2 ACUTE DEEP VEIN THROMBOSIS (DVT) OF PROXIMAL VEIN OF LEFT LOWER EXTREMITY: ICD-10-CM

## 2024-07-22 PROCEDURE — 97161 PT EVAL LOW COMPLEX 20 MIN: CPT | Mod: PN

## 2024-07-22 NOTE — PROGRESS NOTES
[facility]   Physical Therapy Initial Evaluation    Date: 7/22/2024     Name: Jean-Paul Reeves  Clinic Number: [id number]  Therapy Diagnosis:   Encounter Diagnosis   Name Primary?    Acute deep vein thrombosis (DVT) of proximal vein of left lower extremity       Physician: Abdelrahman Lechuga MD      Physician Orders: PT Eval and Treat  Medical Diagnosis from Referral: I82.4Y2 (ICD-10-CM) - Acute deep vein thrombosis (DVT) of proximal vein of left lower extremity   Evaluation Date: 7/22/2024  Authorization Period Expiration: 06/22/2025  Plan of Care Expiration: 09/20/2024    Progress Update: 08/21/2024   Visit # / Visits authorized: 1 / 1   FOTO: 7/22/2024 - Scored: 1 / 3       PRECAUTIONS: DVT with anti-coagulant therapy     Time In: 1350  Time Out: 1420  Total Appointment Time (timed & untimed codes): 30 minutes    SUBJECTIVE     Date of onset: 7/12/2024  Chief Complaint: leg pain    History of current condition - Jean-Paul is a 59 y.o. male who presents to physical therapy reporting 2011, 2016, 3 months ago.  Blood disorder.  Very painful.  Used to wear compression stockings which no longer help.  Recent US with old DVT.      Falls: [x] No  [] Yes:       Imaging: [] Xray [] MRI [x] US: Reviewed    Prior Therapy:  [x] No  [] Yes:   Social History: Pt lives with their spouse [] House [] Apartment/Condo []other  Stairs: [] No  [] Yes:   Occupation: Lead   Prior Level of Function: Independent with all activities of daily living  Current Level of Function: Independent with increasing pain as the day progresses    Pain:  Current 3/10, worst 8/10, best 3 /10   Location: [] Right [x] Left [] Bilateral: leg  Description: Ache, Throb at night  Aggravating Factors: walking  Easing Factors: activity avoidance, rest, medication    Pts goals: Pt reported goals are to improve pain and function    _______________________________________________________  Medical History:   Past Medical History:    Diagnosis Date    Anticoagulant long-term use     started 5-6 years ago Xarelto    Blood clot in vein     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    Medications:   Jean-Paul has a current medication list which includes the following prescription(s): hydrocodone-acetaminophen and xarelto.    Allergies:   Review of patient's allergies indicates:  No Known Allergies       OBJECTIVE     Sensation:  Sensation is [x] Intact [] Impaired-- to light touch    LUMBAR-   Lumbar   Range of Motion Right  (spine) Left Function   & Pain Goal   Lumbar Flexion  100%  []Functional  []Dysfunctional  []Painful  []Non-Painful   Functional   Nonpainful   Lumbar Extension 100%  []Functional  []Dysfunctional  []Painful  []Non-Painful   Functional   Nonpainful   Lumbar Side Bending  100% 100% []Functional  []Dysfunctional  []Painful  []Non-Painful   Functional   Nonpainful   Lumbar Rotation 100% 100% []Functional  []Dysfunctional  []Painful  []Non-Painful   Functional   Nonpainful      HIP-   Hip   Range of Motion Right   Left   Goal   Hip Flexion (120º) 120 120 120º   Hip Abduction (45º) Not tested Not tested  30º   Hip Extension (20º) Not tested  Not tested  15º   Hip internal rotaiton  (45º) WNL WNL 40º   Hip external rotation  (45º) WNL WNL 40º    (*) pain and/or dysfunction(*) pain and/or dysfunction   KNEE-   Knee   Range of Motion Right   Left   Goal   Hyper - Zero - Flexion (cold)   WNL WNL 0-0-130º   Hyper - Zero - Flexion (warm) WNL WNL    (*) pain and/        LE STRENGTH -   Lower Extremity  Strength RIGHT   Goal   Hip Flexion  []1  []2  []3  []4  [x]5                []+ []- 5/5 B    Hip Extension  []1  []2  []3  []4  [x]5                []+ []- 5/5 B   Hip Abduction  []1  []2  []3  []4  [x]5                []+ []- 5/5 B   Knee Flexion []1  []2  []3  []4  [x]5                 "[]+ []- 5/5 B   Knee Extension []1  []2  []3  []4  [x]5                []+ []- 5/5 B   Ankle Dorsiflexion []1  []2  []3  []4  [x]5                []+ []- 5/5 B   Ankle Plantarflexion []1  []2  []3  []4  [x]5                []+ []- 5/5 B     Lower Extremity  Strength LEFT   Goal   Hip Flexion  []1  []2  []3  []4  [x]5                []+ []- 5/5 B    Hip Extension  []1  []2  []3  []4  [x]5                []+ []- 5/5 B   Hip Abduction  []1  []2  []3  []4  [x]5                []+ []- 5/5 B   Knee Flexion []1  []2  []3  []4  [x]5                []+ []- 5/5 B   Knee Extension []1  []2  []3  []4  [x]5                []+ []- 5/5 B   Ankle Dorsiflexion []1  []2  []3  []4  [x]5                []+ []- 5/5 B   Ankle Plantarflexion []1  []2  []3  []4  [x]5                []+ []- 5/5 B        FUNCTION:     Intake Outcome Measure for FOTO Lower leg without knee Survey    Therapist reviewed FOTO scores for Jean-Paul Reeves on 7/22/2024.   FOTO documents entered into [employer] - see Media section.    Intake Score: 63%       ASSESSMENT     Jean-Paul is a 59 y.o. male referred to outpatient Physical Therapy with a medical diagnosis of leg pain:  Encounter Diagnosis   Name Primary?    Acute deep vein thrombosis (DVT) of proximal vein of left lower extremity      The patient was assessed without functional deficit or reduction in range of motion or strength.  During the evaluation it was noted that the patient was to assessed as follows:    "referral to physical therapy for evaluation of lymphedema in lower extremity. For possible support hose. In lymphedema protocol if positive for DVT obviously will need to re-evaluate will probably start on low-molecular heparin twice a day while evaluating for long-term warfarin use"  Dr. Abdelrahman Lechuga (07/12/2024)    Pt prognosis is Good.   Pt will benefit from skilled outpatient Physical Therapy to address the deficits stated above and in the chart below, provide pt/family education, and to maximize pt's level " of independence.     Plan of care discussed with patient: Yes  Pt's spiritual, cultural and educational needs considered and patient is agreeable to the plan of care and goals as stated below:     Anticipated Barriers for therapy: none and occupation    Medical Necessity is demonstrated by the following:     History  Co-morbidities and personal factors that may impact the plan of care [] LOW: no personal factors / co-morbidities  [x] MODERATE: 1-2 personal factors / co-morbidities  [] HIGH: 3+ personal factors / co-morbidities    Moderate / High Support Documentation:   Co-morbidities affecting plan of care:   Past Medical History:   Diagnosis Date    Anticoagulant long-term use     started 5-6 years ago Xarelto    Blood clot in vein     Heterozygous for prothrombin [id number] mutation     Pulmonary embolism     Tachycardia        Personal Factors:   no deficits     Examination  Body Structures and Functions, activity limitations and participation restrictions that may impact the plan of care [] LOW: addressing 1-2 elements  [x] MODERATE: 3+ elements  [] HIGH: 4+ elements (please support below)    Moderate / High Support Documentation:   [] Head / Neck  [] Spine  [] Upper Quarter  [x] Lower Quarter  [] Range of motion Deficits  [] Gross Symmetry Deficits  [] Strength Deficits  [] Balance Deficits  [] Gait Deficits  [] Unable to participate in daily activities  [] Unable to perform functional tasks  [] Unable to Care for Self or others  [] Community/ Social Life changes due to impairments     Clinical Presentation [] LOW: stable  [x] MODERATE: Evolving  [] HIGH: Unstable     Decision Making/ Complexity Score: moderate       PLAN   Plan of care Certification: Lymphedema PT spoke to patient and he rescheduled to be assessed.     Thank you for this referral.    Chris Mata, PT

## 2024-07-27 NOTE — PLAN OF CARE
[facility]   Physical Therapy Initial Evaluation    Date: 7/22/2024     Name: Jean-Paul Reeves  Clinic Number: [id number]  Therapy Diagnosis:   Encounter Diagnosis   Name Primary?    Acute deep vein thrombosis (DVT) of proximal vein of left lower extremity       Physician: Abdelrahman Lechuga MD      Physician Orders: PT Eval and Treat  Medical Diagnosis from Referral: I82.4Y2 (ICD-10-CM) - Acute deep vein thrombosis (DVT) of proximal vein of left lower extremity   Evaluation Date: 7/22/2024  Authorization Period Expiration: 06/22/2025  Plan of Care Expiration: 09/20/2024    Progress Update: 08/21/2024   Visit # / Visits authorized: 1 / 1   FOTO: 7/22/2024 - Scored: 1 / 3       PRECAUTIONS: DVT with anti-coagulant therapy     Time In: 1350  Time Out: 1420  Total Appointment Time (timed & untimed codes): 30 minutes    SUBJECTIVE     Date of onset: 7/12/2024  Chief Complaint: leg pain    History of current condition - Jean-Paul is a 59 y.o. male who presents to physical therapy reporting 2011, 2016, 3 months ago.  Blood disorder.  Very painful.  Used to wear compression stockings which no longer help.  Recent US with old DVT.      Falls: [x] No  [] Yes:       Imaging: [] Xray [] MRI [x] US: Reviewed    Prior Therapy:  [x] No  [] Yes:   Social History: Pt lives with their spouse [] House [] Apartment/Condo []other  Stairs: [] No  [] Yes:   Occupation: Lead   Prior Level of Function: Independent with all activities of daily living  Current Level of Function: Independent with increasing pain as the day progresses    Pain:  Current 3/10, worst 8/10, best 3 /10   Location: [] Right [x] Left [] Bilateral: leg  Description: Ache, Throb at night  Aggravating Factors: walking  Easing Factors: activity avoidance, rest, medication    Pts goals: Pt reported goals are to improve pain and function    _______________________________________________________  Medical History:   Past Medical History:    Diagnosis Date    Anticoagulant long-term use     started 5-6 years ago Xarelto    Blood clot in vein     Heterozygous for prothrombin [id number] mutation     Pulmonary embolism     Tachycardia        Surgical History:   Jean-Paul Reeves  has a past surgical history that includes Appendectomy and Hernia repair.    Medications:   Jean-Paul has a current medication list which includes the following prescription(s): hydrocodone-acetaminophen and xarelto.    Allergies:   Review of patient's allergies indicates:  No Known Allergies       OBJECTIVE     Sensation:  Sensation is [x] Intact [] Impaired-- to light touch    LUMBAR-   Lumbar   Range of Motion Right  (spine) Left Function   & Pain Goal   Lumbar Flexion  100%  []Functional  []Dysfunctional  []Painful  []Non-Painful   Functional   Nonpainful   Lumbar Extension 100%  []Functional  []Dysfunctional  []Painful  []Non-Painful   Functional   Nonpainful   Lumbar Side Bending  100% 100% []Functional  []Dysfunctional  []Painful  []Non-Painful   Functional   Nonpainful   Lumbar Rotation 100% 100% []Functional  []Dysfunctional  []Painful  []Non-Painful   Functional   Nonpainful      HIP-   Hip   Range of Motion Right   Left   Goal   Hip Flexion (120º) 120 120 120º   Hip Abduction (45º) Not tested Not tested  30º   Hip Extension (20º) Not tested  Not tested  15º   Hip internal rotaiton  (45º) WNL WNL 40º   Hip external rotation  (45º) WNL WNL 40º    (*) pain and/or dysfunction(*) pain and/or dysfunction   KNEE-   Knee   Range of Motion Right   Left   Goal   Hyper - Zero - Flexion (cold)   WNL WNL 0-0-130º   Hyper - Zero - Flexion (warm) WNL WNL    (*) pain and/        LE STRENGTH -   Lower Extremity  Strength RIGHT   Goal   Hip Flexion  []1  []2  []3  []4  [x]5                []+ []- 5/5 B    Hip Extension  []1  []2  []3  []4  [x]5                []+ []- 5/5 B   Hip Abduction  []1  []2  []3  []4  [x]5                []+ []- 5/5 B   Knee Flexion []1  []2  []3  []4  [x]5                 "[]+ []- 5/5 B   Knee Extension []1  []2  []3  []4  [x]5                []+ []- 5/5 B   Ankle Dorsiflexion []1  []2  []3  []4  [x]5                []+ []- 5/5 B   Ankle Plantarflexion []1  []2  []3  []4  [x]5                []+ []- 5/5 B     Lower Extremity  Strength LEFT   Goal   Hip Flexion  []1  []2  []3  []4  [x]5                []+ []- 5/5 B    Hip Extension  []1  []2  []3  []4  [x]5                []+ []- 5/5 B   Hip Abduction  []1  []2  []3  []4  [x]5                []+ []- 5/5 B   Knee Flexion []1  []2  []3  []4  [x]5                []+ []- 5/5 B   Knee Extension []1  []2  []3  []4  [x]5                []+ []- 5/5 B   Ankle Dorsiflexion []1  []2  []3  []4  [x]5                []+ []- 5/5 B   Ankle Plantarflexion []1  []2  []3  []4  [x]5                []+ []- 5/5 B        FUNCTION:     Intake Outcome Measure for FOTO Lower leg without knee Survey    Therapist reviewed FOTO scores for Jean-Paul Reeves on 7/22/2024.   FOTO documents entered into [employer] - see Media section.    Intake Score: 63%       ASSESSMENT     Jean-Paul is a 59 y.o. male referred to outpatient Physical Therapy with a medical diagnosis of leg pain:  Encounter Diagnosis   Name Primary?    Acute deep vein thrombosis (DVT) of proximal vein of left lower extremity      The patient was assessed without functional deficit or reduction in range of motion or strength.  During the evaluation it was noted that the patient was to assessed as follows:    "referral to physical therapy for evaluation of lymphedema in lower extremity. For possible support hose. In lymphedema protocol if positive for DVT obviously will need to re-evaluate will probably start on low-molecular heparin twice a day while evaluating for long-term warfarin use"  Dr. Abdelrahman Lechuga (07/12/2024)    Pt prognosis is Good.   Pt will benefit from skilled outpatient Physical Therapy to address the deficits stated above and in the chart below, provide pt/family education, and to maximize pt's level " of independence.     Plan of care discussed with patient: Yes  Pt's spiritual, cultural and educational needs considered and patient is agreeable to the plan of care and goals as stated below:     Anticipated Barriers for therapy: none and occupation    Medical Necessity is demonstrated by the following:     History  Co-morbidities and personal factors that may impact the plan of care [] LOW: no personal factors / co-morbidities  [x] MODERATE: 1-2 personal factors / co-morbidities  [] HIGH: 3+ personal factors / co-morbidities    Moderate / High Support Documentation:   Co-morbidities affecting plan of care:   Past Medical History:   Diagnosis Date    Anticoagulant long-term use     started 5-6 years ago Xarelto    Blood clot in vein     Heterozygous for prothrombin [id number] mutation     Pulmonary embolism     Tachycardia        Personal Factors:   no deficits     Examination  Body Structures and Functions, activity limitations and participation restrictions that may impact the plan of care [] LOW: addressing 1-2 elements  [x] MODERATE: 3+ elements  [] HIGH: 4+ elements (please support below)    Moderate / High Support Documentation:   [] Head / Neck  [] Spine  [] Upper Quarter  [x] Lower Quarter  [] Range of motion Deficits  [] Gross Symmetry Deficits  [] Strength Deficits  [] Balance Deficits  [] Gait Deficits  [] Unable to participate in daily activities  [] Unable to perform functional tasks  [] Unable to Care for Self or others  [] Community/ Social Life changes due to impairments     Clinical Presentation [] LOW: stable  [x] MODERATE: Evolving  [] HIGH: Unstable     Decision Making/ Complexity Score: moderate       PLAN   Plan of care Certification: Lymphedema PT spoke to patient and he rescheduled to be assessed.     Thank you for this referral.    Chris Mata, PT

## 2025-07-12 DIAGNOSIS — I82.4Y2 ACUTE DEEP VEIN THROMBOSIS (DVT) OF PROXIMAL VEIN OF LEFT LOWER EXTREMITY: ICD-10-CM

## 2025-07-14 RX ORDER — RIVAROXABAN 20 MG/1
20 TABLET, FILM COATED ORAL
Qty: 90 TABLET | Refills: 3 | Status: SHIPPED | OUTPATIENT
Start: 2025-07-14

## (undated) DEVICE — SYR 10CC LUER LOCK

## (undated) DEVICE — SOL 9P NACL IRR PIC IL

## (undated) DEVICE — GLOVE SURG BIOGEL LATEX SZ 7.5

## (undated) DEVICE — NDL SAFETY 25G X 1.5 ECLIPSE

## (undated) DEVICE — SEE MEDLINE ITEM 157027

## (undated) DEVICE — APPLICATOR CHLORAPREP ORN 26ML

## (undated) DEVICE — CLOSURE SKIN STERI STRIP 1/2X4

## (undated) DEVICE — SUT MCRYL PLUS 4-0 PS2 27IN

## (undated) DEVICE — SUT VICRYL 3-0 27 SH

## (undated) DEVICE — ELECTRODE REM PLYHSV RETURN 9

## (undated) DEVICE — COVER OVERHEAD SURG LT BLUE

## (undated) DEVICE — DRESSING TRANS 4X4 TEGADERM

## (undated) DEVICE — ADHESIVE MASTISOL VIAL 48/BX

## (undated) DEVICE — GAUZE SPONGE 4X4 12PLY

## (undated) DEVICE — DECANTER VIAL ASEPTIC TRANSFER

## (undated) DEVICE — MANIFOLD 4 PORT

## (undated) DEVICE — SUT VICRYL 0 SH

## (undated) DEVICE — SEE MEDLINE ITEM 157117

## (undated) DEVICE — DRAPE LAP TIBURON 77X122IN